# Patient Record
Sex: FEMALE | Race: WHITE | Employment: UNEMPLOYED | URBAN - METROPOLITAN AREA
[De-identification: names, ages, dates, MRNs, and addresses within clinical notes are randomized per-mention and may not be internally consistent; named-entity substitution may affect disease eponyms.]

---

## 2018-07-12 ENCOUNTER — OFFICE VISIT (OUTPATIENT)
Dept: FAMILY MEDICINE CLINIC | Age: 38
End: 2018-07-12

## 2018-07-12 VITALS
SYSTOLIC BLOOD PRESSURE: 150 MMHG | RESPIRATION RATE: 16 BRPM | TEMPERATURE: 98.9 F | BODY MASS INDEX: 46.71 KG/M2 | OXYGEN SATURATION: 98 % | WEIGHT: 263.6 LBS | HEIGHT: 63 IN | DIASTOLIC BLOOD PRESSURE: 90 MMHG | HEART RATE: 114 BPM

## 2018-07-12 DIAGNOSIS — R03.0 ELEVATED BP WITHOUT DIAGNOSIS OF HYPERTENSION: ICD-10-CM

## 2018-07-12 DIAGNOSIS — R22.2 ABDOMINAL WALL LUMP: Primary | ICD-10-CM

## 2018-07-12 DIAGNOSIS — Z31.9 PATIENT DESIRES PREGNANCY: ICD-10-CM

## 2018-07-12 DIAGNOSIS — Z87.42 HX OF ABNORMAL CERVICAL PAP SMEAR: ICD-10-CM

## 2018-07-12 DIAGNOSIS — Z76.89 ESTABLISHING CARE WITH NEW DOCTOR, ENCOUNTER FOR: ICD-10-CM

## 2018-07-12 NOTE — PROGRESS NOTES
HPI     Chief Complaint   Patient presents with   [address]     She is a 40 y.o. female with hx of preeclampsia in prior pregnancy who presents to establish care and discuss new abdominal mass. Establishing Care  - Recently moved to [facility],  is in the [employer]  - No current medical problems  - Not on any medications  - UTD on immunizations that she knows of    Attempting to get Pregnant  - Has been trying since October when she stopped her OCP  - States she was able to get pregnant easily with her last baby  - No hx of STIs/ infections  -  - delivered in  [place]  at 44 weeks, induced for preeclampsia    Hx of Preeclampsia, Elevated BP today  - Typically borderline 130/80s, has been tracking BPs at home for last week  - Was not on Mg during her last pregancy  - States she did not have hx of HTN before pregnancy but admits she was not regularly seeing a PCP either    Hx of Abnormal PAP  - Abnormal PAP 4 years ago, unsure what diagnosis was  - Was supposed to have repeat 3 years ago but has not   - Would like to have PAP    Abdominal Mass  - States she first noticed it 1.5 years ago  - Over liver edge  - States the mass has slowly been getting larger  - Denies pain, weight loss, n/v    Review of Systems   Constitutional: Negative for activity change and unexpected weight change. Gastrointestinal: Negative for abdominal pain, nausea and vomiting. Genitourinary: Negative for menstrual problem and pelvic pain. Neurological: Negative for headaches. Reviewed PmHx, RxHx, FmHx, SocHx, AllgHx and updated and dated in the chart. Physical Exam:  Visit Vitals    /90 (BP 1 Location: Left arm, BP Patient Position: Sitting)    Pulse (!) 114    Temp 98.9 °F (37.2 °C) (Oral)    Resp 16    Ht 5' 3\" (1.6 m)    Wt 263 lb 9.6 oz (119.6 kg)    LMP 2018 (Exact Date)    SpO2 98%    BMI 46.69 kg/m2     Physical Exam   Constitutional: She appears well-developed and well-nourished. No distress. Cardiovascular: Normal rate, regular rhythm and normal heart sounds. Exam reveals no gallop and no friction rub. No murmur heard. Pulmonary/Chest: Effort normal and breath sounds normal. No respiratory distress. She has no wheezes. She has no rales. Abdominal: Soft. She exhibits no distension. There is no tenderness. There is no rebound and no guarding. Morbidly obese, small mobile round abdominal wall mass [id number] in RUQ over liver edge without TTP   Skin: Skin is warm and dry. She is not diaphoretic. Psychiatric: She has a normal mood and affect. Her behavior is normal.   Nursing note and vitals reviewed. No results found for this or any previous visit (from the past 12 hour(s)).        Assessment / Plan     Abdominal Wall Lump  - On PE appears to be lipoma but will order US to better visualize as it has been growing in size and she has had no further workup  - Abd US for RUQ    Establishing Care with New Doctor  - Instructed to come back next week for CPE at which time we will do a PAP and routine blood work  - Recommend her filling out a release of records form so we can request prior labs/ studies from her last OB    Patient desires pregnancy  - Counseled patient that most healthy patients who have had a prior successful pregnancy get pregnant within the 1st year of trying  - Patent admits she is 30 lbs heavier since her last pregnancy and this may be contributing to difficulty in conceiving  - Discussed risks if she were to become pregnant again, would likely need to be on baby ASA for her hx of PreE and would recommend genetic testing for AMA    Elevated BP without diagnosis of HTN  - Hx of PreE in prior pregnancy, unsure of any hx of chronic HTN  - Patient has BP log with her today and appears very borderline with BP 130s/ 80s often  - Recommend patient continue to keep log of BP and bring them to her next appt in 1 week    Hx of Abnormal PAP  - Will repeat PAP at next appt for CPE    Follow up in 1 week for CPE    I have discussed the diagnosis with the patient and the intended plan as seen in the above orders. The patient has received an after-visit summary and questions were answered concerning future plans. I have discussed medication side effects and warnings with the patient as well.     Patient discussed with Dr. Cm Perez MD (Attending)    Kp Llamas DO  Family Medicine Resident

## 2018-07-12 NOTE — PATIENT INSTRUCTIONS
If you have not received a phone call within 24 hours regarding scheduling your diagnostic imaging please call central scheduling at [phone]. Ultrasound of the Abdomen: About This Test  What is it? An abdominal ultrasound uses reflected sound waves to produce a picture of the organs and blood vessels in the upper belly. These include your liver, gallbladder, spleen, pancreas, kidneys, and major blood vessels like the aorta. The sound waves create a picture on a video monitor. Why is this test done? An ultrasound can be done to:  · Find out what's causing belly pain-a gallstone, for example. · Look at a lump or mass that was found in a prior exam and, in some cases, tell what it is. · Check for problems in the liver and kidneys. · Look for changes in an aortic aneurysm, a bulging section in the wall of the large artery that carries blood out of the heart. · Find fluid in the belly. · Guide needles or other medical instruments in treatment. How can you prepare for the test?  · Depending on the part of your belly your doctor will be looking at, you may need to eat a fat-free meal the night before your test. Or you may need to avoid eating for 4 to 12 hours before the test. Your doctor will tell you what to do. What happens before the test?  · You will need to take off any jewelry that might interfere with the ultrasound test.  · You will need to take off your clothes around the area to be scanned. You will get a cloth or paper covering to use during the test.  What happens during the test?  · You lie down on your back or your side on an exam table. · The doctor or technologist spreads some warm gel on your belly to improve the transmission of the sound waves. A small handheld unit called a transducer is pressed against your belly and is moved back and forth. A picture of the organs and blood vessels can be seen on a video monitor. · You need to lie still.  You may be asked to take a breath and hold it for several seconds during the scanning. What else should you know about the test?  · The ultrasound is a very safe procedure. · There is no discomfort from the test itself. If your belly hurts already, you will feel some pain from the probe being pressed down on it. · You will not hear or feel the sound waves. How long does the test take? · The test will take about 30 minutes. · You may be asked to wait until the radiologist has reviewed the scan. He or she may want to do more ultrasound views of some areas of your belly. What happens after the test?  · You will probably be able to go home right away. · You can go back to your usual activities right away. Follow-up care is a key part of your treatment and safety. Be sure to make and go to all appointments, and call your doctor if you are having problems. It's also a good idea to keep a list of the medicines you take. Ask your doctor when you can expect to have your test results. Where can you learn more? Go to http://mirela-servando.info/. Enter T376 in the search box to learn more about \"Ultrasound of the Abdomen: About This Test.\"  Current as of: October 9, 2017  Content Version: 11.7  © 9568-5185 [employer]. Care instructions adapted under license by seniorshelf.com (which disclaims liability or warranty for this information).  If you have questions about a medical condition or this instruction, always ask your healthcare professional. [place] [address] any warranty or liability for your use of this information.

## 2018-07-12 NOTE — MR AVS SNAPSHOT
[address] 
[phone] Patient: Anita Padron MRN: [id number]:[id number] Visit Information Date & Time Provider Department Dept. Phone Encounter #  
 7/12/2018  1:30 PM Mack Sharma, [address] [phone] [id number] Upcoming Health Maintenance Date Due DTaP/Tdap/Td series (1 - Tdap) 8/21/2002 PAP AKA CERVICAL CYTOLOGY 8/21/2002 Influenza Age 5 to Adult 8/1/2018 Allergies as of 7/12/2018  Review Complete On: 7/12/2018 By: Trista Rollins Severity Noted Reaction Type Reactions Penicillins  07/12/2018    Rash Current Immunizations  Never Reviewed No immunizations on file. Not reviewed this visit You Were Diagnosed With   
  
 Codes Comments Abdominal wall lump    -  Primary ICD-10-CM: R22.2 ICD-9-CM: 789.30 Vitals BP Pulse Temp Resp Height(growth percentile) Weight(growth percentile) 150/90 (BP 1 Location: Left arm, BP Patient Position: Sitting) (!) 114 98.9 °F (37.2 °C) (Oral) 16 5' 3\" (1.6 m) 263 lb 9.6 oz (119.6 kg) LMP SpO2 BMI OB Status Smoking Status 07/04/2018 (Exact Date) 98% 46.69 kg/m2 Having regular periods Never Smoker Vitals History BMI and BSA Data Body Mass Index Body Surface Area  
 46.69 kg/m 2 2.31 m 2 Preferred Pharmacy Pharmacy Name Phone [address], [facility] [address] [facility] [address] [phone] Your Updated Medication List  
  
Notice  As of 7/12/2018  2:32 PM  
 You have not been prescribed any medications. To-Do List   
 07/13/2018 Imaging:  US ABD LTD Patient Instructions If you have not received a phone call within 24 hours regarding scheduling your diagnostic imaging please call central scheduling at [phone]. Ultrasound of the Abdomen: About This Test 
What is it? An abdominal ultrasound uses reflected sound waves to produce a picture of the organs and blood vessels in the upper belly. These include your liver, gallbladder, spleen, pancreas, kidneys, and major blood vessels like the aorta. The sound waves create a picture on a video monitor. Why is this test done? An ultrasound can be done to: · Find out what's causing belly pain-a gallstone, for example. · Look at a lump or mass that was found in a prior exam and, in some cases, tell what it is. · Check for problems in the liver and kidneys. · Look for changes in an aortic aneurysm, a bulging section in the wall of the large artery that carries blood out of the heart. · Find fluid in the belly. · Guide needles or other medical instruments in treatment. How can you prepare for the test? 
· Depending on the part of your belly your doctor will be looking at, you may need to eat a fat-free meal the night before your test. Or you may need to avoid eating for 4 to 12 hours before the test. Your doctor will tell you what to do. What happens before the test? 
· You will need to take off any jewelry that might interfere with the ultrasound test. 
· You will need to take off your clothes around the area to be scanned. You will get a cloth or paper covering to use during the test. 
What happens during the test? 
· You lie down on your back or your side on an exam table. · The doctor or technologist spreads some warm gel on your belly to improve the transmission of the sound waves. A small handheld unit called a transducer is pressed against your belly and is moved back and forth. A picture of the organs and blood vessels can be seen on a video monitor. · You need to lie still. You may be asked to take a breath and hold it for several seconds during the scanning. What else should you know about the test? 
· The ultrasound is a very safe procedure. · There is no discomfort from the test itself.  If your belly hurts already, you will feel some pain from the probe being pressed down on it. · You will not hear or feel the sound waves. How long does the test take? · The test will take about 30 minutes. · You may be asked to wait until the radiologist has reviewed the scan. He or she may want to do more ultrasound views of some areas of your belly. What happens after the test? 
· You will probably be able to go home right away. · You can go back to your usual activities right away. Follow-up care is a key part of your treatment and safety. Be sure to make and go to all appointments, and call your doctor if you are having problems. It's also a good idea to keep a list of the medicines you take. Ask your doctor when you can expect to have your test results. Where can you learn more? Go to http://mirela-servando.info/. Enter T376 in the search box to learn more about \"Ultrasound of the Abdomen: About This Test.\" Current as of: October 9, 2017 Content Version: 11.7 © 6975-5914 Healthwise, Incorporated. Care instructions adapted under license by [employer] (which disclaims liability or warranty for this information). If you have questions about a medical condition or this instruction, always ask your healthcare professional. [address] any warranty or liability for your use of this information. 
  
 
 
 
  
Introducing [facility]! [facility] introduces ivi.ru patient portal. Now you can access parts of your medical record, email your doctor's office, and request medication refills online. 1. In your internet browser, go to [email or web address] 2. Click on the First Time User? Click Here link in the Sign In box. You will see the New Member Sign Up page. 3. Enter your ivi.ru Access Code exactly as it appears below. You will not need to use this code after youve completed the sign-up process.  If you do not sign up before the expiration date, you must request a new code. · [employer] Access Code: [email or web address] Expires: 10/10/2018  2:30 PM 
 
4. Enter the last four digits of your Social Security Number (xxxx) and Date of Birth (mm/dd/yyyy) as indicated and click Submit. You will be taken to the next sign-up page. 5. Create a Jack [employer] Block ID. This will be your Jack [employer] login ID and cannot be changed, so think of one that is secure and easy to remember. 6. Create a [employer] password. You can change your password at any time. 7. Enter your Password Reset Question and Answer. This can be used at a later time if you forget your password. 8. Enter your e-mail address. You will receive e-mail notification when new information is available in [address] 9. Click Sign Up. You can now view and download portions of your medical record. 10. Click the Download Summary menu link to download a portable copy of your medical information. If you have questions, please visit the Frequently Asked Questions section of the Jack [employer] website. Remember, [employer] is NOT to be used for urgent needs. For medical emergencies, dial 911. Now available from your iPhone and Android! Please provide this summary of care documentation to your next provider. If you have any questions after today's visit, please call [phone].

## 2018-07-13 PROBLEM — Z87.42 HX OF ABNORMAL CERVICAL PAP SMEAR: Status: ACTIVE | Noted: 2018-07-13

## 2018-07-13 PROBLEM — R03.0 ELEVATED BP WITHOUT DIAGNOSIS OF HYPERTENSION: Status: ACTIVE | Noted: 2018-07-13

## 2018-07-19 ENCOUNTER — HOSPITAL ENCOUNTER (OUTPATIENT)
Dept: LAB | Age: 38
Discharge: HOME | End: 2018-07-19
Payer: OTHER GOVERNMENT

## 2018-07-19 ENCOUNTER — OFFICE VISIT (OUTPATIENT)
Dept: FAMILY MEDICINE CLINIC | Age: 38
End: 2018-07-19

## 2018-07-19 VITALS
WEIGHT: 264.8 LBS | HEIGHT: 63 IN | RESPIRATION RATE: 16 BRPM | SYSTOLIC BLOOD PRESSURE: 154 MMHG | DIASTOLIC BLOOD PRESSURE: 92 MMHG | TEMPERATURE: 99.5 F | HEART RATE: 89 BPM | BODY MASS INDEX: 46.92 KG/M2 | OXYGEN SATURATION: 98 %

## 2018-07-19 DIAGNOSIS — Z29.9 PREVENTIVE MEASURE: Primary | ICD-10-CM

## 2018-07-19 DIAGNOSIS — Z80.3 FAMILY HISTORY OF BREAST CANCER: ICD-10-CM

## 2018-07-19 DIAGNOSIS — R03.0 ELEVATED BP WITHOUT DIAGNOSIS OF HYPERTENSION: ICD-10-CM

## 2018-07-19 DIAGNOSIS — D22.9 ATYPICAL MOLE: ICD-10-CM

## 2018-07-19 DIAGNOSIS — Z80.3 FHX: BREAST CANCER IN FIRST DEGREE RELATIVE: ICD-10-CM

## 2018-07-19 PROCEDURE — 87624 HPV HI-RISK TYP POOLED RSLT: CPT | Performed by: FAMILY MEDICINE

## 2018-07-19 PROCEDURE — 88175 CYTOPATH C/V AUTO FLUID REDO: CPT | Performed by: FAMILY MEDICINE

## 2018-07-19 NOTE — PATIENT INSTRUCTIONS
If you have not received a phone call within 24 hours regarding scheduling your diagnostic imaging please call central scheduling at [phone]. Skin Lesions: Care Instructions  Your Care Instructions  A skin lesion is a general term used for the different types of bumps, spots, moles or other growths that may appear on your skin. Most skin lesions are harmless, but sometimes they can be a sign of skin cancer or other health problems. Depending on what type of lesion you have, your doctor may cut out all or a small area of the skin tissue and send it to a lab to be looked at under a microscope. This is called a biopsy. A biopsy may be done to figure out what the lesion is or to make sure it is not skin cancer. Follow-up care is a key part of your treatment and safety. Be sure to make and go to all appointments, and call your doctor if you are having problems. It's also a good idea to know your test results and keep a list of the medicines you take. How can you care for yourself at home? · If your doctor told you how to care for your wound, follow your doctor's instructions. If you did not get instructions, follow this general advice:  ¨ Keep the wound bandaged and dry for the first day. ¨ After the first day, wash around the wound with clean water 2 times a day. Don't use hydrogen peroxide or alcohol, which can slow healing. ¨ You may cover the wound with a thin layer of petroleum jelly, such as Vaseline, and a nonstick bandage. ¨ Apply more petroleum jelly and replace the bandage as needed. · If you have stitches, you may get other instructions. You will have to return to have the stitches removed. · If a scab forms, do not pull it off. Let it fall off on its own. Wounds heal faster if no scab forms. Washing the area every day and using petroleum jelly will help keep a scab from forming. · If the wound bleeds, put direct pressure on it with a clean cloth until the bleeding stops.   · Take an over-the-counter pain medicine, such as acetaminophen (Tylenol), ibuprofen (Advil, Motrin), or naproxen (Aleve). Read and follow all instructions on the label. · Do not take two or more pain medicines at the same time unless the doctor told you to. Many pain medicines have acetaminophen, which is Tylenol. Too much acetaminophen (Tylenol) can be harmful. · If you had a growth \"frozen\" off with liquid nitrogen, you may get a blister. Do not break it. Let it dry up on its own. It is common for the blister to fill with blood. You do not need to do anything about this, but if it becomes too painful, call your doctor. When should you call for help? Call your doctor now or seek immediate medical care if:     · You have signs of infection, such as:  ¨ Increased pain, swelling, warmth, or redness. ¨ Red streaks leading from the wound. ¨ Pus draining from the wound. ¨ A fever.    Watch closely for changes in your health, and be sure to contact your doctor if:     · The wound changes, bleeds, or gets worse.      · You do not get better after 2 weeks of home care. Where can you learn more? Go to http://mirela-servando.info/. Enter U851 in the search box to learn more about \"Skin Lesions: Care Instructions. \"  Current as of: October 5, 2017  Content Version: 11.7  © 4232-8618 Intelligent InSites. Care instructions adapted under license by StreetÂ LibraryÂ Network (which disclaims liability or warranty for this information).  If you have questions about a medical condition or this instruction, always ask your healthcare professional. [address] any warranty or liability for your use of this information.

## 2018-07-19 NOTE — PROGRESS NOTES
HPI     Chief Complaint   Patient presents with    Gyn Exam     pap only     She is a 40 y.o. female who presents for PAP and preventive care. Preventive care  - Would like preventive labs, PAP today    Elevated BP without hx of HTN  - Hx of preeclampsia in prior pregnacies  - Risk factors: morbid obesity   - Has been taking -270 systolic and 08C diastolic at home  - Does not adhere to a healthy diet    Moles   - Has had multiple moles for years   - Has a mole on her breast that has changed colors, has not gotten bigger  - Wears sunscreen  - Has not seen a dermatologist recently    Family Hx of breast cancer  - Mom had breast cancer in 35s  - Mom had lumpectomy did not require chemo  - Mom has not had genetic testing  - Has never had mammogram    Review of Systems   Eyes: Negative for visual disturbance. Gastrointestinal: Negative for abdominal pain. Genitourinary: Negative for vaginal bleeding, vaginal discharge and vaginal pain. Neurological: Negative for headaches. Reviewed PmHx, RxHx, FmHx, SocHx, AllgHx and updated and dated in the chart. Physical Exam:  Visit Vitals    BP (!) 154/92 (BP 1 Location: Left arm, BP Patient Position: Sitting)    Pulse 89    Temp 99.5 °F (37.5 °C) (Oral)    Resp 16    Ht 5' 3\" (1.6 m)    Wt 264 lb 12.8 oz (120.1 kg)    LMP 07/04/2018 (Exact Date)    SpO2 98%    BMI 46.91 kg/m2     Physical Exam   Constitutional: She appears well-developed and well-nourished. No distress. Cardiovascular: Normal rate, regular rhythm and normal heart sounds. Exam reveals no gallop and no friction rub. No murmur heard. Pulmonary/Chest: Effort normal and breath sounds normal. No respiratory distress. She has no wheezes. She has no rales. Abdominal: Soft. She exhibits no distension and no mass. There is no tenderness. There is no rebound and no guarding. Genitourinary: Vagina normal and uterus normal. No vaginal discharge found.    Genitourinary Comments: Cervix very anterior, closed with scant white discharge, no cervical motion tenderness, no adenopathy. Skin: She is not diaphoretic. Small 3mm mole, red in color, not completely symmetrical on L breast     No results found for this or any previous visit (from the past 12 hour(s)). Assessment / Plan     Preventive Care  - Labs - CBC, CMP, HgA1c, lipid panel, TSH  - Recommend dietary and lifestyle modifications due to morbid obesity, BMI 46  - PAP today    Fam Hx of breast cancer  - Mom had breast cancer in early 35s  - Mom is still living  - Recommend BL mammogram   - Mom would be a good candidate for possible genetic screening given young age    Elevated BP without diagnosis of HTN  - Continuing to montior with home BP cuff and SBP <130 and DBP <80 always  - Initially high, recheck today was WNL  - Discussed given her morbid obesity she is at high risk for developing true dx of HTN and she should lose weight to decrease this risk     Moles  - Does have 1 small mole on L breast that is <6mm in size however given hx of changing color would recommend a punch biopsy  - Instructed patient to schedule a punch biopsy as soon as possible     Follow up in next 2 weeks for punch biopsy    I have discussed the diagnosis with the patient and the intended plan as seen in the above orders. The patient has received an after-visit summary and questions were answered concerning future plans. I have discussed medication side effects and warnings with the patient as well.     Patient discussed with Dr. Kate Guerra MD (Attending)    Zay Ayala DO  Family Medicine Resident

## 2018-07-19 NOTE — PROGRESS NOTES
Chief Complaint   Patient presents with    Gyn Exam     pap only     1. Have you been to the ER, urgent care clinic since your last visit? Hospitalized since your last visit? No    2. Have you seen or consulted any other health care providers outside of the [address] [place] [address] since your last visit? Include any pap smears or colon screening.  No

## 2018-07-19 NOTE — MR AVS SNAPSHOT
[address] 
[phone] Patient: Tyrone Holland MRN: [id number] XX Visit Information Date & Time Provider Department Dept. Phone Encounter #  
 2018  4:00 PM Yimi Turner, [facility] [phone] [id number] Upcoming Health Maintenance Date Due DTaP/Tdap/Td series (1 - Tdap) 2002 PAP AKA CERVICAL CYTOLOGY 2002 Influenza Age 5 to Adult 2018 Allergies as of 2018  Review Complete On: 2018 By: Renato Hager Severity Noted Reaction Type Reactions Penicillins  2018    Rash Current Immunizations  Never Reviewed No immunizations on file. Not reviewed this visit You Were Diagnosed With   
  
 Codes Comments Family history of breast cancer    -  Primary ICD-10-CM: Z80.3 ICD-9-CM: V16.3 Preventive measure     ICD-10-CM: Z29.9 ICD-9-CM: V07.9 Vitals BP Pulse Temp Resp Height(growth percentile) Weight(growth percentile) (!) 154/92 (BP 1 Location: Left arm, BP Patient Position: Sitting) 89 99.5 °F (37.5 °C) (Oral) 16 5' 3\" (1.6 m) 264 lb 12.8 oz (120.1 kg) LMP SpO2 BMI OB Status Smoking Status 2018 (Exact Date) 98% 46.91 kg/m2 Having regular periods Never Smoker Vitals History BMI and BSA Data Body Mass Index Body Surface Area  
 46.91 kg/m 2 2.31 m 2 Preferred Pharmacy Pharmacy Name Phone [address], [address] [place] [address] [address] [facility] [phone] Your Updated Medication List  
  
Notice  As of 2018  4:39 PM  
 You have not been prescribed any medications. To-Do List   
 2018 Lab:  CBC W/O DIFF   
  
 2018 Lab:  HEMOGLOBIN A1C WITH EAG   
  
 2018 Lab:  LIPID PANEL   
  
 2018 Imaging:  JAYLIN MAMMO BI SCREENING INCL CAD   
  
 2018 Lab: METABOLIC PANEL, COMPREHENSIVE   
  
 07/20/2018 Lab:  TSH 3RD GENERATION   
  
 07/26/2018 10:00 AM  
  Appointment with Sapna Holloway at [facility] ([phone]) General  NPO DIET RESTICTIONS Please be NPO (nothing by mouth) for 6- 8 hours prior to procedure. GENERAL INSTRUCTIONS 1. Bring any non [facility] facility films/reports pertaining to the area being studied with you on the day of appointment. 2. A written order with a valid diagnosis and Physicians signature is required for all scheduled tests. 3. Check in at registration 30 minutes before your appointment time unless you were instructed to do otherwise. Patient Instructions Skin Lesions: Care Instructions Your Care Instructions A skin lesion is a general term used for the different types of bumps, spots, moles or other growths that may appear on your skin. Most skin lesions are harmless, but sometimes they can be a sign of skin cancer or other health problems. Depending on what type of lesion you have, your doctor may cut out all or a small area of the skin tissue and send it to a lab to be looked at under a microscope. This is called a biopsy. A biopsy may be done to figure out what the lesion is or to make sure it is not skin cancer. Follow-up care is a key part of your treatment and safety. Be sure to make and go to all appointments, and call your doctor if you are having problems. It's also a good idea to know your test results and keep a list of the medicines you take. How can you care for yourself at home? · If your doctor told you how to care for your wound, follow your doctor's instructions. If you did not get instructions, follow this general advice: ¨ Keep the wound bandaged and dry for the first day. ¨ After the first day, wash around the wound with clean water 2 times a day. Don't use hydrogen peroxide or alcohol, which can slow healing. ¨ You may cover the wound with a thin layer of petroleum jelly, such as Vaseline, and a nonstick bandage. ¨ Apply more petroleum jelly and replace the bandage as needed. · If you have stitches, you may get other instructions. You will have to return to have the stitches removed. · If a scab forms, do not pull it off. Let it fall off on its own. Wounds heal faster if no scab forms. Washing the area every day and using petroleum jelly will help keep a scab from forming. · If the wound bleeds, put direct pressure on it with a clean cloth until the bleeding stops. · Take an over-the-counter pain medicine, such as acetaminophen (Tylenol), ibuprofen (Advil, Motrin), or naproxen (Aleve). Read and follow all instructions on the label. · Do not take two or more pain medicines at the same time unless the doctor told you to. Many pain medicines have acetaminophen, which is Tylenol. Too much acetaminophen (Tylenol) can be harmful. · If you had a growth \"frozen\" off with liquid nitrogen, you may get a blister. Do not break it. Let it dry up on its own. It is common for the blister to fill with blood. You do not need to do anything about this, but if it becomes too painful, call your doctor. When should you call for help? Call your doctor now or seek immediate medical care if: 
   · You have signs of infection, such as: 
¨ Increased pain, swelling, warmth, or redness. ¨ Red streaks leading from the wound. ¨ Pus draining from the wound. ¨ A fever.  
 Watch closely for changes in your health, and be sure to contact your doctor if: 
   · The wound changes, bleeds, or gets worse.  
   · You do not get better after 2 weeks of home care. Where can you learn more? Go to http://mirela-srevando.info/. Enter I189 in the search box to learn more about \"Skin Lesions: Care Instructions. \" Current as of: October 5, 2017 Content Version: 11.7 © 7961-2667 Healthwise, Incorporated. Care instructions adapted under license by [employer] (which disclaims liability or warranty for this information). If you have questions about a medical condition or this instruction, always ask your healthcare professional. [address] any warranty or liability for your use of this information. 
  
 
 
 
  
Introducing [facility] & HEALTH SERVICES! [facility] introduces [employer] patient portal. Now you can access parts of your medical record, email your doctor's office, and request medication refills online. 1. In your internet browser, go to [email or web address] 2. Click on the First Time User? Click Here link in the Sign In box. You will see the New Member Sign Up page. 3. Enter your [employer] Access Code exactly as it appears below. You will not need to use this code after youve completed the sign-up process. If you do not sign up before the expiration date, you must request a new code. · [employer] Access Code: [email or web address] Expires: 10/10/2018  2:30 PM 
 
4. Enter the last four digits of your Social Security Number (xxxx) and Date of Birth (mm/dd/yyyy) as indicated and click Submit. You will be taken to the next sign-up page. 5. Create a [employer] ID. This will be your [employer] login ID and cannot be changed, so think of one that is secure and easy to remember. 6. Create a [employer] password. You can change your password at any time. 7. Enter your Password Reset Question and Answer. This can be used at a later time if you forget your password. 8. Enter your e-mail address. You will receive e-mail notification when new information is available in [address] 9. Click Sign Up. You can now view and download portions of your medical record. 10. Click the Download Summary menu link to download a portable copy of your medical information.  
 
If you have questions, please visit the Frequently Asked Questions section of the VFA. Remember, [employer] is NOT to be used for urgent needs. For medical emergencies, dial 911. Now available from your iPhone and Android! Please provide this summary of care documentation to your next provider. If you have any questions after today's visit, please call [phone].

## 2018-07-23 ENCOUNTER — LAB ONLY (OUTPATIENT)
Dept: FAMILY MEDICINE CLINIC | Age: 38
End: 2018-07-23

## 2018-07-23 DIAGNOSIS — Z29.9 PREVENTIVE MEASURE: ICD-10-CM

## 2018-07-23 PROBLEM — Z80.3 FHX: BREAST CANCER IN FIRST DEGREE RELATIVE: Status: ACTIVE | Noted: 2018-07-23

## 2018-07-23 NOTE — MR AVS SNAPSHOT
2100 [facility] 1007 [facility] 
[phone] Patient: Stacy Morrow MRN: [id number] [id number] Visit Information Date & Time Provider Department Dept. Phone Encounter #  
 7/23/2018  8:30 AM LAB [facility] [address] [phone] [id number] Your Appointments 8/3/2018 11:15 AM  
ROUTINE CARE with Arcenio Bunn DO [address] ([address]) Appt Note: Biopsy [address] 1007 [facility]  
[phone]  
  
   
 [address] [facility] 99 [address] Upcoming Health Maintenance Date Due DTaP/Tdap/Td series (1 - Tdap) 8/21/2002 PAP AKA CERVICAL CYTOLOGY 8/21/2002 Influenza Age 5 to Adult 8/1/2018 Allergies as of 7/23/2018  Review Complete On: 7/23/2018 By: Arcenio Bunn DO Severity Noted Reaction Type Reactions Penicillins  07/12/2018    Rash Current Immunizations  Never Reviewed No immunizations on file. Not reviewed this visit You Were Diagnosed With   
  
 Codes Comments Preventive measure     ICD-10-CM: Z29.9 ICD-9-CM: V07.9 Vitals LMP OB Status Smoking Status 07/04/2018 (Exact Date) Having regular periods Never Smoker Preferred Pharmacy Pharmacy Name Phone [address], [address] [place] [address] [address]. [address] [facility] [address] [phone] Your Updated Medication List  
  
Notice  As of 7/23/2018  4:38 PM  
 You have not been prescribed any medications. We Performed the Following CBC W/O DIFF [19911 CPT(R)] HEMOGLOBIN A1C WITH EAG [52561 CPT(R)] LIPID PANEL [74209 CPT(R)] METABOLIC PANEL, COMPREHENSIVE [22195 CPT(R)] TSH 3RD GENERATION [24482 CPT(R)] To-Do List   
 07/26/2018 10:00 AM  
  Appointment with Sapna Holloway at [facility] ([phone]) General  NPO DIET RESTICTIONS Please be NPO (nothing by mouth) for 6- 8 hours prior to procedure. GENERAL INSTRUCTIONS 1. Bring any non [facility] facility films/reports pertaining to the area being studied with you on the day of appointment. 2. A written order with a valid diagnosis and Physicians signature is required for all scheduled tests. 3. Check in at registration 30 minutes before your appointment time unless you were instructed to do otherwise.  
  
 07/30/2018 12:30 PM  
  Appointment with [facility] JAYLIN 1 at [place] [address] Mammography ([phone]) Shower or bathe using soap and water. Do not use deodorant, powder, perfumes, or lotion the day of your exam.  If your prior mammograms were not performed at [facility] 6 please bring films with you or forward prior images 2 days before your procedure. Check in at registration 15min before your appointment time unless you were instructed to do otherwise. A script is not necessary, but if you have one, please bring it on the day of the mammogram or have it faxed to the department. You are responsible for finding a method of transportation to your appointment. If you don't have transportation, please reschedule your appointment at least 24 hours in advance. [facility] [phone] [facility]  [phone] [facility] 19 [facility]  [phone] [facility] [phone] [address] [phone] Introducing [facility] & HE[facility] SERVICES! Case Brito introduces [employer] patient portal. Now you can access parts of your medical record, email your doctor's office, and request medication refills online. 1. In your internet browser, go to [email or web address] 2. Click on the First Time User? Click Here link in the Sign In box. You will see the New Member Sign Up page. 3. Enter your [employer] Access Code exactly as it appears below. You will not need to use this code after youve completed the sign-up process.  If you do not sign up before the expiration date, you must request a new code. · [employer] Access Code: [email or web address] Expires: 10/10/2018  2:30 PM 
 
4. Enter the last four digits of your Social Security Number (xxxx) and Date of Birth (mm/dd/yyyy) as indicated and click Submit. You will be taken to the next sign-up page. 5. Create a [employer] ID. This will be your [employer] login ID and cannot be changed, so think of one that is secure and easy to remember. 6. Create a [employer] password. You can change your password at any time. 7. Enter your Password Reset Question and Answer. This can be used at a later time if you forget your password. 8. Enter your e-mail address. You will receive e-mail notification when new information is available in [address] 9. Click Sign Up. You can now view and download portions of your medical record. 10. Click the Download Summary menu link to download a portable copy of your medical information. If you have questions, please visit the Frequently Asked Questions section of the [employer] website. Remember, [employer] is NOT to be used for urgent needs. For medical emergencies, dial 911. Now available from your iPhone and Android! Please provide this summary of care documentation to your next provider. If you have any questions after today's visit, please call [phone].

## 2018-07-24 ENCOUNTER — TELEPHONE (OUTPATIENT)
Dept: FAMILY MEDICINE CLINIC | Age: 38
End: 2018-07-24

## 2018-07-24 LAB
ALBUMIN SERPL-MCNC: 4.2 G/DL (ref 3.5–5.5)
ALBUMIN/GLOB SERPL: 1.4 {RATIO} (ref 1.2–2.2)
ALP SERPL-CCNC: 66 IU/L (ref 39–117)
ALT SERPL-CCNC: 41 IU/L (ref 0–32)
AST SERPL-CCNC: 26 IU/L (ref 0–40)
BILIRUB SERPL-MCNC: 1.2 MG/DL (ref 0–1.2)
BUN SERPL-MCNC: 13 MG/DL (ref 6–20)
BUN/CREAT SERPL: 16 (ref 9–23)
CALCIUM SERPL-MCNC: 9.1 MG/DL (ref 8.7–10.2)
CHLORIDE SERPL-SCNC: 105 MMOL/L (ref 96–106)
CHOLEST SERPL-MCNC: 186 MG/DL (ref 100–199)
CO2 SERPL-SCNC: 20 MMOL/L (ref 20–29)
CREAT SERPL-MCNC: 0.8 MG/DL (ref 0.57–1)
ERYTHROCYTE [DISTWIDTH] IN BLOOD BY AUTOMATED COUNT: 14.2 % (ref 12.3–15.4)
EST. AVERAGE GLUCOSE BLD GHB EST-MCNC: 105 MG/DL
GLOBULIN SER CALC-MCNC: 2.9 G/DL (ref 1.5–4.5)
GLUCOSE SERPL-MCNC: 92 MG/DL (ref 65–99)
HBA1C MFR BLD: 5.3 % (ref 4.8–5.6)
HCT VFR BLD AUTO: 42 % (ref 34–46.6)
HDLC SERPL-MCNC: 40 MG/DL
HGB BLD-MCNC: 14.1 G/DL (ref 11.1–15.9)
INTERPRETATION, 910389: NORMAL
LDLC SERPL CALC-MCNC: 122 MG/DL (ref 0–99)
MCH RBC QN AUTO: 27.6 PG (ref 26.6–33)
MCHC RBC AUTO-ENTMCNC: 33.6 G/DL (ref 31.5–35.7)
MCV RBC AUTO: 82 FL (ref 79–97)
PLATELET # BLD AUTO: 338 X10E3/UL (ref 150–379)
POTASSIUM SERPL-SCNC: 4.8 MMOL/L (ref 3.5–5.2)
PROT SERPL-MCNC: 7.1 G/DL (ref 6–8.5)
RBC # BLD AUTO: 5.1 X10E6/UL (ref 3.77–5.28)
SODIUM SERPL-SCNC: 140 MMOL/L (ref 134–144)
TRIGL SERPL-MCNC: 118 MG/DL (ref 0–149)
TSH SERPL DL<=0.005 MIU/L-ACNC: 2.01 UIU/ML (ref 0.45–4.5)
VLDLC SERPL CALC-MCNC: 24 MG/DL (ref 5–40)
WBC # BLD AUTO: 4.8 X10E3/UL (ref 3.4–10.8)

## 2018-07-24 NOTE — TELEPHONE ENCOUNTER
7/24/2018 4:29 PM    Discussed lab results with patient. LDL mildly elevated at 122 recommend watching fried/ fast food intake and things high in cholesterol. ALT mildly elevated. Patient due to have abd US for abd wall mass. Will evaluate for possible NAFLD at that time. Consider Hep B/ C labs at next visit on August 3rd. PAP, CBC, HgA1c, TSH WNL.      Shante Luong DO  Family Med Resident, PGY2

## 2018-07-24 NOTE — PROGRESS NOTES
TSH, HgA1c, CBC WNL, CMP shows mildly elevated ALT will likely need Hep C lab and will follow up abd US

## 2018-07-26 ENCOUNTER — HOSPITAL ENCOUNTER (OUTPATIENT)
Dept: ULTRASOUND IMAGING | Age: 38
Discharge: HOME | End: 2018-07-26
Attending: FAMILY MEDICINE
Payer: OTHER GOVERNMENT

## 2018-07-26 DIAGNOSIS — R22.2 ABDOMINAL WALL LUMP: ICD-10-CM

## 2018-07-26 PROCEDURE — 76705 ECHO EXAM OF ABDOMEN: CPT

## 2018-07-30 ENCOUNTER — TELEPHONE (OUTPATIENT)
Dept: FAMILY MEDICINE CLINIC | Age: 38
End: 2018-07-30

## 2018-07-30 ENCOUNTER — HOSPITAL ENCOUNTER (OUTPATIENT)
Dept: MAMMOGRAPHY | Age: 38
Discharge: HOME | End: 2018-07-30
Attending: FAMILY MEDICINE
Payer: OTHER GOVERNMENT

## 2018-07-30 DIAGNOSIS — Z80.3 FAMILY HISTORY OF BREAST CANCER IN MOTHER: Primary | ICD-10-CM

## 2018-07-30 DIAGNOSIS — Z80.3 FAMILY HISTORY OF BREAST CANCER: ICD-10-CM

## 2018-07-30 PROCEDURE — 77067 SCR MAMMO BI INCL CAD: CPT

## 2018-07-30 NOTE — TELEPHONE ENCOUNTER
7/30/2018 5:45 PM    Spoke with patient about results of abd US and mammogram. Abd US shows lipoma. Mammogram shows BIRAD 1. Based on Roselia risk she has a 20.8% lifetime risk for breast cancer and qualifies for annual mammo and MRI. Discussed this will be a better modality with dense breast tissue. Refer to breast surgeon for discussion of genetic testing and significant fam hx of breast cancer. Patient understands and agrees to plan.     Greer Caba, DO  Family Med Resident, PGY2

## 2018-08-03 ENCOUNTER — HOSPITAL ENCOUNTER (OUTPATIENT)
Dept: LAB | Age: 38
Discharge: HOME | End: 2018-08-03

## 2018-08-03 ENCOUNTER — OFFICE VISIT (OUTPATIENT)
Dept: FAMILY MEDICINE CLINIC | Age: 38
End: 2018-08-03

## 2018-08-03 VITALS
OXYGEN SATURATION: 98 % | DIASTOLIC BLOOD PRESSURE: 92 MMHG | HEIGHT: 63 IN | RESPIRATION RATE: 17 BRPM | SYSTOLIC BLOOD PRESSURE: 141 MMHG | WEIGHT: 267 LBS | HEART RATE: 76 BPM | BODY MASS INDEX: 47.31 KG/M2 | TEMPERATURE: 98.4 F

## 2018-08-03 DIAGNOSIS — Z98.890 S/P SKIN BIOPSY: ICD-10-CM

## 2018-08-03 DIAGNOSIS — L98.9 SKIN LESION: Primary | ICD-10-CM

## 2018-08-03 NOTE — PATIENT INSTRUCTIONS
MD Janette Wright MD  [address]  [place] [address] [facility], [address] [facility],[address]  [place], [address] [place] [address]  T [phone]  F [phone]    Skin Cancer Prevention: Care Instructions  Your Care Instructions    Skin cancer is the abnormal growth of cells in the skin. It usually appears as a growth that changes in color, shape, or size. This can be a sore that does not heal or a change in a wart or a mole. Skin cancer is almost always curable when found early and treated. So it is important to see your doctor if you have any of these changes in your skin. Skin cancer is the most common type of cancer. It often appears on areas of the body that have been exposed to the sun, such as the head, face, neck, back, chest, or shoulders. Follow-up care is a key part of your treatment and safety. Be sure to make and go to all appointments, and call your doctor if you are having problems. It's also a good idea to know your test results and keep a list of the medicines you take. How can you care for yourself at home? · Wear a wide-brimmed hat and long sleeves and pants if you are going to be outdoors for a long time. · Avoid the sun between 10 a.m. and 4 p.m., which is the peak time for UV rays. · Wear sunscreen on exposed skin. Make sure to use a broad-spectrum sunscreen that has a sun protection factor (SPF) of 30 or higher. Use it every day, even when it is cloudy. · Do not use tanning booths or sunlamps. · Use lip balm or cream that has sun protection factor (SPF) to protect your lips from getting sunburned. · Wear sunglasses that block UV rays. When should you call for help? Call your doctor now or seek immediate medical care if:     · You have signs of infection, such as:  ¨ Increased pain, swelling, warmth, or redness. ¨ Red streaks leading from the area. ¨ Pus draining from the area.   ¨ A fever.    Watch closely for changes in your health, and be sure to contact your doctor if:     · You see a change in your skin, such as a growth or mole that:  ¨ Grows bigger. This may happen very slowly. ¨ Changes color. ¨ Changes shape. ¨ Starts to bleed easily.    · You have swollen glands in your armpits, groin, or neck.      · You do not get better as expected. Where can you learn more? Go to http://mirela-servando.info/. Enter P392 in the search box to learn more about \"Skin Cancer Prevention: Care Instructions. \"  Current as of: May 12, 2017  Content Version: 11.7  © 5291-9484 [employer]. Care instructions adapted under license by [employer] (which disclaims liability or warranty for this information).  If you have questions about a medical condition or this instruction, always ask your healthcare professional. [address] any warranty or liability for your use of this information.

## 2018-08-03 NOTE — PROGRESS NOTES
[facility] Medicine Procedure Note    Procedure - Punch biopsy of skin lesion on L breast  Date: 8/3/2018  Time: 5:03 PM  Indication: Diagnosis of lesion  Pre-procedure diagnosis: Change in skin color  Post-procedure diagnosis: Same as above, awaiting final pathology diagnosis  Procedure performed by: Greer Caba DO  Procedure supervised by: Dr. Suellen Gaming DO  Anesthesia: Lidocaine 1% with epinephrine  EBL: < 1cc  Description of procedure: Time out was completed verifying correct patient. The procedure and expectations following treatment were explained to the patient. The patient signed written consent form discussing risks and benefits of the procedure including infection, bleeding, damage to surrounding area. The patient was placed in supine position on exam table. The area to be treated (~4mm on L breast at 8') was exposed, cleaned with chloraprep swabs x 3. Injected 3cc of Lidocaine 1% with epi to the surrounding area until adequate anesthesia was achieved. A 4mm punch biopsy was taken from the middle the lesion over an area with scale. The cored sample was then cut away from it's base using scissors. Pressure was applied with gauze for hemostasis, no silver nitrate or stitch was needed. A pressure bandage was applied over the biopsy sites. The patient tolerated the procedure well and was without complaint.   Total time: 10 mins  Complications: None     Patient seen and discussed with Dr. Suellen Gaming DO (Attending)

## 2018-08-03 NOTE — PROGRESS NOTES
Chief Complaint   Patient presents with    Procedure     punch biopsy left breast       1. Have you been to the ER, urgent care clinic since your last visit? Hospitalized since your last visit?no    2. Have you seen or consulted any other health care providers outside of the [address] [place] [address] since your last visit? Include any pap smears or colon screening.  No

## 2018-08-03 NOTE — MR AVS SNAPSHOT
[address] [phone] Patient: Mike Martínez MRN: [id number] GED:[birth year] Visit Information Date & Time Provider Department Dept. Phone Encounter #  
 8/3/2018 11:15 AM Zay Ayala DO [facility] [facility] [phone] [id number] Upcoming Health Maintenance Date Due DTaP/Tdap/Td series (1 - Tdap) 8/21/2002 Influenza Age 5 to Adult 8/1/2018 PAP AKA CERVICAL CYTOLOGY 7/19/2021 Allergies as of 8/3/2018  Review Complete On: 8/3/2018 By: Sai Lobo LPN Severity Noted Reaction Type Reactions Penicillins  07/12/2018    Rash Current Immunizations  Never Reviewed No immunizations on file. Not reviewed this visit You Were Diagnosed With   
  
 Codes Comments Skin lesion    -  Primary ICD-10-CM: L98.9 ICD-9-CM: 709.9 Vitals BP Pulse Temp Resp Height(growth percentile) Weight(growth percentile) (!) 141/92 76 98.4 °F (36.9 °C) (Oral) 17 5' 3\" (1.6 m) 267 lb (121.1 kg) LMP SpO2 BMI OB Status Smoking Status 07/04/2018 (Exact Date) 98% 47.3 kg/m2 Having regular periods Never Smoker Vitals History BMI and BSA Data Body Mass Index Body Surface Area  
 47.3 kg/m 2 2.32 m 2 Preferred Pharmacy Pharmacy Name Phone [address], [facility] [address] [facility] [phone] Your Updated Medication List  
  
Notice  As of 8/3/2018 12:22 PM  
 You have not been prescribed any medications. We Performed the Following REFERRAL TO DERMATOLOGY [[id number] Custom] Comments:  
 Tigre Perez MD 
[facility] [address] Phone: [phone] Fax: [phone] Angel Nascimento MD 
[facility] [address] [place] [address] Phone: [phone] Fax: [phone] SURGICAL PATHOLOGY [[id number] Custom] Referral Information Referral ID Referred By Referred To  
  
 [id number] Evelio Giraldo Not Available Visits Status Start Date End Date 1 New Request 8/3/18 8/3/19 If your referral has a status of pending review or denied, additional information will be sent to support the outcome of this decision. Patient Instructions MD Ryan Rosales MD 
[address] [place] [address], [address] [phone] Skin Cancer Prevention: Care Instructions Your Care Instructions Skin cancer is the abnormal growth of cells in the skin. It usually appears as a growth that changes in color, shape, or size. This can be a sore that does not heal or a change in a wart or a mole. Skin cancer is almost always curable when found early and treated. So it is important to see your doctor if you have any of these changes in your skin. Skin cancer is the most common type of cancer. It often appears on areas of the body that have been exposed to the sun, such as the head, face, neck, back, chest, or shoulders. Follow-up care is a key part of your treatment and safety. Be sure to make and go to all appointments, and call your doctor if you are having problems. It's also a good idea to know your test results and keep a list of the medicines you take. How can you care for yourself at home? · Wear a wide-brimmed hat and long sleeves and pants if you are going to be outdoors for a long time. · Avoid the sun between 10 a.m. and 4 p.m., which is the peak time for UV rays. · Wear sunscreen on exposed skin. Make sure to use a broad-spectrum sunscreen that has a sun protection factor (SPF) of 30 or higher. Use it every day, even when it is cloudy. · Do not use tanning booths or sunlamps.  
· Use lip balm or cream that has sun protection factor (SPF) to protect your lips from getting sunburned. · Wear sunglasses that block UV rays. When should you call for help? Call your doctor now or seek immediate medical care if: 
   · You have signs of infection, such as: 
¨ Increased pain, swelling, warmth, or redness. ¨ Red streaks leading from the area. ¨ Pus draining from the area. ¨ A fever.  
 Watch closely for changes in your health, and be sure to contact your doctor if: 
   · You see a change in your skin, such as a growth or mole that: 
¨ Grows bigger. This may happen very slowly. ¨ Changes color. ¨ Changes shape. ¨ Starts to bleed easily.    · You have swollen glands in your armpits, groin, or neck.  
   · You do not get better as expected. Where can you learn more? Go to http://mirela-servando.info/. Enter P392 in the search box to learn more about \"Skin Cancer Prevention: Care Instructions. \" Current as of: May 12, 2017 Content Version: 11.7 © 3395-6127 [place]. Care instructions adapted under license by [employer] (which disclaims liability or warranty for this information). If you have questions about a medical condition or this instruction, always ask your healthcare professional. [address] [address] any warranty or liability for your use of this information. 
  
 
 
 
  
Introducing [facility]! Dear Lynae Favre: Thank you for requesting a mobiManage account. Our records indicate that you already have an active mobiManage account. You can access your account anytime at [email or web address] Did you know that you can access your hospital and ER discharge instructions at any time in mobiManage? You can also review all of your test results from your hospital stay or ER visit. Additional Information If you have questions, please visit the Frequently Asked Questions section of the mobiManage website at [email or web address] Remember, MyChart is NOT to be used for urgent needs. For medical emergencies, dial 911. Now available from your iPhone and Android! Please provide this summary of care documentation to your next provider. Your primary care clinician is listed as Leonides Oquendo. If you have any questions after today's visit, please call [phone].

## 2018-08-07 ENCOUNTER — TELEPHONE (OUTPATIENT)
Dept: FAMILY MEDICINE CLINIC | Age: 38
End: 2018-08-07

## 2018-08-07 NOTE — TELEPHONE ENCOUNTER
8/7/2018 10:18 AM    Called patient to discuss recent bx results. Showed dermal hemangioma. States the area has already scabbed over. A little pink surrounding. Discussed if redness worsens she has fever, chills or warmth surrounding area with purulent drainage to call back and let us know. States she will keep an eye on it and has none of these sx as this time.      Dion Noriega,   Family Med Resident, PGY2

## 2018-08-29 NOTE — PROGRESS NOTES
I was present during the critical and key portions of the procedure and immediately available to furnish services.  (Other procedure 5 minutes)

## 2018-10-17 ENCOUNTER — DOCUMENTATION ONLY (OUTPATIENT)
Dept: FAMILY MEDICINE CLINIC | Age: 38
End: 2018-10-17

## 2019-06-13 ENCOUNTER — OFFICE VISIT (OUTPATIENT)
Dept: FAMILY MEDICINE CLINIC | Age: 39
End: 2019-06-13

## 2019-06-13 VITALS
BODY MASS INDEX: 45 KG/M2 | TEMPERATURE: 98.6 F | HEART RATE: 103 BPM | DIASTOLIC BLOOD PRESSURE: 84 MMHG | WEIGHT: 254 LBS | RESPIRATION RATE: 16 BRPM | OXYGEN SATURATION: 98 % | HEIGHT: 63 IN | SYSTOLIC BLOOD PRESSURE: 121 MMHG

## 2019-06-13 DIAGNOSIS — O09.521 MULTIGRAVIDA OF ADVANCED MATERNAL AGE IN FIRST TRIMESTER: ICD-10-CM

## 2019-06-13 DIAGNOSIS — N92.6 MISSED MENSES: ICD-10-CM

## 2019-06-13 DIAGNOSIS — O10.919 HTN IN PREGNANCY, CHRONIC: ICD-10-CM

## 2019-06-13 DIAGNOSIS — O99.210 MATERNAL OBESITY AFFECTING PREGNANCY, ANTEPARTUM: ICD-10-CM

## 2019-06-13 DIAGNOSIS — Z3A.01 LESS THAN 8 WEEKS GESTATION OF PREGNANCY: Primary | ICD-10-CM

## 2019-06-13 PROBLEM — O09.529 ANTEPARTUM MULTIGRAVIDA OF ADVANCED MATERNAL AGE: Status: ACTIVE | Noted: 2019-06-13

## 2019-06-13 LAB
HCG URINE, QL. (POC): POSITIVE
VALID INTERNAL CONTROL?: YES

## 2019-06-13 NOTE — PROGRESS NOTES
HPI     Chief Complaint   Patient presents with    Missed Menses     She is a 41 y.o. female who presents for missed menses. Missed Menses  -  at 6w4d by LMP 19  - 1st pregnancy , , boy 39w0d, induced for preE, 6lb13 oz  - Hx of chronic HTN, has been checking BP 2x daily at home since dx of pregnancy and have all been WNL  - Denies VB, LOF, ctx, not feeling baby move yet (normal for GA)  - Taking PNV  - Desires genetic testing, is okay with requesting sex but does not want to know until anatomy scan    Review of Systems   Constitutional: Negative for fever and unexpected weight change. Gastrointestinal: Negative for abdominal pain. Genitourinary: Negative for vaginal bleeding. Reviewed PmHx, RxHx, FmHx, SocHx, AllgHx and updated and dated in the chart. Physical Exam:  Visit Vitals  /84 (BP 1 Location: Left arm, BP Patient Position: Sitting)   Pulse (!) 103   Temp 98.6 °F (37 °C) (Oral)   Resp 16   Ht 5' 3\" (1.6 m)   Wt 254 lb (115.2 kg)   LMP 2019   SpO2 98%   BMI 44.99 kg/m²     Physical Exam   Constitutional: She appears well-developed and well-nourished. No distress. Cardiovascular: Normal rate, regular rhythm and normal heart sounds. Exam reveals no gallop and no friction rub. No murmur heard. Pulmonary/Chest: Effort normal and breath sounds normal. No stridor. No respiratory distress. She has no wheezes. She has no rales. Skin: Skin is warm and dry. She is not diaphoretic. Psychiatric: She has a normal mood and affect. Her behavior is normal.   Nursing note and vitals reviewed.     UPT neg  Recent Results (from the past 12 hour(s))   AMB POC URINE PREGNANCY TEST, VISUAL COLOR COMPARISON    Collection Time: 19  9:16 AM   Result Value Ref Range    VALID INTERNAL CONTROL POC Yes     HCG urine, Ql. (POC) Positive Negative          Assessment / Plan     Pregnancy at 6w4d by LMP  - Continue PNV  - Dating US scheduled 7/15 with Dr. Lynn Chavez  - Initial prenatal 7/17 with Dr. Verena Dennis  - Given chronic HTN and hx of PrE will need to start ASA 81mg at 12 weeks, needs EKG, 24 hr TUP, Pr/Cr, CMP in addition to prenatal labs  - BP elevated on first check today, WNL on 2nd check, will likely not start meds until SBP >150 (discussed with Dr. Rodrigue Millard)  - Will need early GTT testing/ HgA1c given maternal obesity  - Will do Panorama at initial visit with fetal sex reported however will not relay these results to Mom as she does not want to know them until the US    Follow up for initial prenatal     I have discussed the diagnosis with the patient and the intended plan as seen in the above orders. The patient has received an after-visit summary and questions were answered concerning future plans. I have discussed medication side effects and warnings with the patient as well.     Patient discussed with Dr. Rodrigue Millard (Attending)    Ifrah Sykes DO  Family Medicine Resident

## 2019-06-13 NOTE — PATIENT INSTRUCTIONS
Weeks 6 to 10 of Your Pregnancy: Care Instructions  Your Care Instructions    Congratulations on your pregnancy. This is an exciting and important time for you. During the first 6 to 10 weeks of your pregnancy, your body goes through many changes. Your baby grows very fast, even though you cannot feel it yet. You may start to notice that you feel different, both in your body and your emotions. Because each woman's pregnancy is unique, there is no right way to feel. You may feel the healthiest you have ever been, or you may feel tired or sick to your stomach (\"morning sickness\"). These early weeks are a time to make healthy choices and to eat the best foods for you and your baby. This care sheet will give you some ideas. This is also a good time to think about birth defects testing. These are tests done during pregnancy to look for possible problems with the baby. First trimester tests for birth defects can be done between 8 and 17 weeks of pregnancy, depending on the test. Talk with your doctor about what kinds of tests are available. Follow-up care is a key part of your treatment and safety. Be sure to make and go to all appointments, and call your doctor if you are having problems. It's also a good idea to know your test results and keep a list of the medicines you take. How can you care for yourself at home? Eat well  · Eat at least 3 meals and 2 healthy snacks every day. Eat fresh, whole foods, including:  ? 7 or more servings of bread, tortillas, cereal, rice, pasta, or oatmeal.  ? 3 or more servings of vegetables, especially leafy green vegetables. ? 2 or more servings of fruits. ? 3 or more servings of milk, yogurt, or cheese. ? 2 or more servings of meat, turkey, chicken, fish, eggs, or dried beans. · Drink plenty of fluids, especially water. Avoid sodas and other sweetened drinks. · Choose foods that have important vitamins for your baby, such as calcium, iron, and folate.   ? Dairy products, tofu, canned fish with bones, almonds, broccoli, dark leafy greens, corn tortillas, and fortified orange juice are good sources of calcium. ? Beef, poultry, liver, spinach, lentils, dried beans, fortified cereals, and dried fruits are rich in iron. ? Dark leafy greens, broccoli, asparagus, liver, fortified cereals, orange juice, peanuts, and almonds are good sources of folate. · Avoid foods that could harm your baby. ? Do not eat raw or undercooked meat, chicken, or fish (such as sushi or raw oysters). ? Do not eat raw eggs or foods that contain raw eggs, such as Caesar dressing. ? Do not eat soft cheeses and unpasteurized dairy foods, such as Brie, feta, or blue cheese. ? Do not eat fish that contains a lot of mercury, such as shark, swordfish, tilefish, or seferino mackerel. Do not eat more than 6 ounces of tuna each week. ? Do not eat raw sprouts, especially alfalfa sprouts. ? Cut down on caffeine, such as coffee, tea, and cola. Protect yourself and your baby  · Do not touch eduardo litter or cat feces. They can cause an infection that could harm your baby. · High body temperature can be harmful to your baby. So if you want to use a sauna or hot tub, be sure to talk to your doctor about how to use it safely. [place] with morning sickness  · Sip small amounts of water, juices, or shakes. Try drinking between meals, not with meals. · Eat 5 or 6 small meals a day. Try dry toast or crackers when you first get up, and eat breakfast a little later. · Avoid spicy, greasy, and fatty foods. · When you feel sick, open your windows or go for a short walk to get fresh air. · Try nausea wristbands. These help some women. · Tell your doctor if you think your prenatal vitamins make you sick. Where can you learn more? Go to http://halina.info/. Enter G112 in the search box to learn more about \"Weeks 6 to 10 of Your Pregnancy: Care Instructions. \"  Current as of: September 5, 2018  Content Version: 11.9  © 2166-8962 [employer]. Care instructions adapted under license by [employer] (which disclaims liability or warranty for this information). If you have questions about a medical condition or this instruction, always ask your healthcare professional. [address] any warranty or liability for your use of this information.      About When to Call Your Doctor During Pregnancy (Up to 20 Weeks)  Your Care Instructions    It's common to have concerns about what might be a problem during pregnancy. Although most pregnant women don't have any serious problems, it's important to know when to call your doctor if you have certain symptoms. These are general suggestions. Your doctor may give you some more information about when to call. When to call your doctor (up to 20 weeks)  Call 911 anytime you think you may need emergency care. For example, call if:  · You passed out (lost consciousness). Call your doctor now or seek immediate medical care if:  · You have a fever. · You have vaginal bleeding. · You are dizzy or lightheaded, or you feel like you may faint. · You have symptoms of a urinary tract infection. These may include:  ? Pain or burning when you urinate. ? A frequent need to urinate without being able to pass much urine. ? Pain in the flank, which is just below the rib cage and above the waist on either side of the back. ? Blood in your urine. · You have belly pain. · You think you are having contractions. · You have a sudden release of fluid from your vagina. Watch closely for changes in your health, and be sure to contact your doctor if:  · You have vaginal discharge that smells bad. · You have other concerns about your pregnancy. Follow-up care is a key part of your treatment and safety. Be sure to make and go to all appointments, and call your doctor if you are having problems.  It's also a good idea to know your test results and keep a list of the medicines you take. Where can you learn more? Go to http://mirela-servando.info/. Enter F770 in the search box to learn more about \"Learning About When to Call Your Doctor During Pregnancy (Up to 20 Weeks). \"  Current as of: September 5, 2018  Content Version: 11.9  © 6717-1699 [facility]. Care instructions adapted under license by [employer] (which disclaims liability or warranty for this information).  If you have questions about a medical condition or this instruction, always ask your healthcare professional. [address] any warranty or liability for your use of this information.

## 2019-06-13 NOTE — PROGRESS NOTES
Chief Complaint   Patient presents with    Missed Menses     1. Have you been to the ER, urgent care clinic since your last visit? Hospitalized since your last visit? No    2. Have you seen or consulted any other health care providers outside of the [facility] since your last visit? Include any pap smears or colon screening.  No

## 2019-06-25 ENCOUNTER — HOSPITAL ENCOUNTER (EMERGENCY)
Age: 39
Discharge: HOME | End: 2019-06-25
Attending: EMERGENCY MEDICINE
Payer: OTHER GOVERNMENT

## 2019-06-25 ENCOUNTER — APPOINTMENT (OUTPATIENT)
Dept: ULTRASOUND IMAGING | Age: 39
End: 2019-06-25
Attending: EMERGENCY MEDICINE
Payer: OTHER GOVERNMENT

## 2019-06-25 ENCOUNTER — OFFICE VISIT (OUTPATIENT)
Dept: FAMILY MEDICINE CLINIC | Age: 39
End: 2019-06-25

## 2019-06-25 ENCOUNTER — TELEPHONE (OUTPATIENT)
Dept: FAMILY MEDICINE CLINIC | Age: 39
End: 2019-06-25

## 2019-06-25 VITALS
WEIGHT: 255 LBS | HEART RATE: 112 BPM | BODY MASS INDEX: 45.18 KG/M2 | RESPIRATION RATE: 18 BRPM | TEMPERATURE: 99 F | SYSTOLIC BLOOD PRESSURE: 126 MMHG | OXYGEN SATURATION: 98 % | HEIGHT: 63 IN | DIASTOLIC BLOOD PRESSURE: 70 MMHG

## 2019-06-25 VITALS
WEIGHT: 248 LBS | DIASTOLIC BLOOD PRESSURE: 95 MMHG | TEMPERATURE: 99.2 F | BODY MASS INDEX: 43.94 KG/M2 | HEIGHT: 63 IN | RESPIRATION RATE: 18 BRPM | SYSTOLIC BLOOD PRESSURE: 168 MMHG | HEART RATE: 120 BPM | OXYGEN SATURATION: 97 %

## 2019-06-25 DIAGNOSIS — O03.9 MISCARRIAGE: Primary | ICD-10-CM

## 2019-06-25 DIAGNOSIS — Z34.90 PREGNANCY, UNSPECIFIED GESTATIONAL AGE: ICD-10-CM

## 2019-06-25 DIAGNOSIS — O20.0 THREATENED ABORTION: Primary | ICD-10-CM

## 2019-06-25 PROBLEM — E66.01 OBESITY, MORBID (HCC): Status: ACTIVE | Noted: 2019-06-25

## 2019-06-25 LAB
ALBUMIN SERPL-MCNC: 3.9 G/DL (ref 3.5–5)
ALBUMIN/GLOB SERPL: 1 {RATIO} (ref 1.1–2.2)
ALP SERPL-CCNC: 71 U/L (ref 45–117)
ALT SERPL-CCNC: 39 U/L (ref 12–78)
ANION GAP SERPL CALC-SCNC: 7 MMOL/L (ref 5–15)
AST SERPL-CCNC: 19 U/L (ref 15–37)
BASOPHILS # BLD: 0.1 K/UL (ref 0–0.1)
BASOPHILS NFR BLD: 1 % (ref 0–1)
BILIRUB SERPL-MCNC: 0.7 MG/DL (ref 0.2–1)
BILIRUB UR QL STRIP: NEGATIVE
BUN SERPL-MCNC: 15 MG/DL (ref 6–20)
BUN/CREAT SERPL: 18 (ref 12–20)
CALCIUM SERPL-MCNC: 8.9 MG/DL (ref 8.5–10.1)
CHLORIDE SERPL-SCNC: 108 MMOL/L (ref 97–108)
CO2 SERPL-SCNC: 25 MMOL/L (ref 21–32)
COMMENT, HOLDF: NORMAL
CREAT SERPL-MCNC: 0.84 MG/DL (ref 0.55–1.02)
DIFFERENTIAL METHOD BLD: ABNORMAL
EOSINOPHIL # BLD: 0.2 K/UL (ref 0–0.4)
EOSINOPHIL NFR BLD: 2 % (ref 0–7)
ERYTHROCYTE [DISTWIDTH] IN BLOOD BY AUTOMATED COUNT: 13.5 % (ref 11.5–14.5)
GLOBULIN SER CALC-MCNC: 3.9 G/DL (ref 2–4)
GLUCOSE SERPL-MCNC: 115 MG/DL (ref 65–100)
GLUCOSE UR-MCNC: NEGATIVE MG/DL
HCT VFR BLD AUTO: 43.2 % (ref 35–47)
HGB BLD-MCNC: 14.6 G/DL (ref 11.5–16)
IMM GRANULOCYTES # BLD AUTO: 0 K/UL (ref 0–0.04)
IMM GRANULOCYTES NFR BLD AUTO: 0 % (ref 0–0.5)
KETONES P FAST UR STRIP-MCNC: NEGATIVE MG/DL
LYMPHOCYTES # BLD: 2 K/UL (ref 0.8–3.5)
LYMPHOCYTES NFR BLD: 19 % (ref 12–49)
MCH RBC QN AUTO: 28.1 PG (ref 26–34)
MCHC RBC AUTO-ENTMCNC: 33.8 G/DL (ref 30–36.5)
MCV RBC AUTO: 83.2 FL (ref 80–99)
MONOCYTES # BLD: 0.8 K/UL (ref 0–1)
MONOCYTES NFR BLD: 8 % (ref 5–13)
NEUTS SEG # BLD: 7.6 K/UL (ref 1.8–8)
NEUTS SEG NFR BLD: 70 % (ref 32–75)
NRBC # BLD: 0 K/UL (ref 0–0.01)
NRBC BLD-RTO: 0 PER 100 WBC
PH UR STRIP: 6.5 [PH] (ref 4.6–8)
PLATELET # BLD AUTO: 405 K/UL (ref 150–400)
PMV BLD AUTO: 9.2 FL (ref 8.9–12.9)
POTASSIUM SERPL-SCNC: 3.7 MMOL/L (ref 3.5–5.1)
PROT SERPL-MCNC: 7.8 G/DL (ref 6.4–8.2)
PROT UR QL STRIP: NEGATIVE
RBC # BLD AUTO: 5.19 M/UL (ref 3.8–5.2)
SAMPLES BEING HELD,HOLD: NORMAL
SODIUM SERPL-SCNC: 140 MMOL/L (ref 136–145)
SP GR UR STRIP: 1 (ref 1–1.03)
UA UROBILINOGEN AMB POC: NORMAL (ref 0.2–1)
URINALYSIS CLARITY POC: CLEAR
URINALYSIS COLOR POC: YELLOW
URINE BLOOD POC: NEGATIVE
URINE LEUKOCYTES POC: NEGATIVE
URINE NITRITES POC: NEGATIVE
WBC # BLD AUTO: 10.6 K/UL (ref 3.6–11)

## 2019-06-25 PROCEDURE — 76817 TRANSVAGINAL US OBSTETRIC: CPT

## 2019-06-25 PROCEDURE — 99281 EMR DPT VST MAYX REQ PHY/QHP: CPT

## 2019-06-25 PROCEDURE — 80053 COMPREHEN METABOLIC PANEL: CPT

## 2019-06-25 PROCEDURE — 76801 OB US < 14 WKS SINGLE FETUS: CPT

## 2019-06-25 PROCEDURE — 36415 COLL VENOUS BLD VENIPUNCTURE: CPT

## 2019-06-25 PROCEDURE — 85025 COMPLETE CBC W/AUTO DIFF WBC: CPT

## 2019-06-25 NOTE — PROGRESS NOTES
I reviewed with the resident the medical history and the resident's findings on the physical examination. I discussed with the resident the patient's diagnosis and concur with the plan. Nydia Dalton is a 41 y.o. female  at ~8 weeks by LMP. Due for dating ultrasound 7/15. Concerns addressed: Concern for 1st trimester bleeding. Pregnancy complicated by: Morbid Obesity, history of Pre-E, Hx of chronic hypertension. Denies VB, LOF, Contractions. No Fetal movement to date. Weight gain reviewed. RTC in 1 week    Visit Vitals  /70 (BP 1 Location: Left arm, BP Patient Position: Sitting)   Pulse (!) 112   Temp 99 °F (37.2 °C) (Oral)   Resp 18   Ht 5' 3\" (1.6 m)   Wt 255 lb (115.7 kg)   SpO2 98%   BMI 45.17 kg/m²     FHT: early in pregnancy  FH: early in pregnancy    Recent Results (from the past 24 hour(s))   AMB POC URINALYSIS DIP STICK AUTO W/O MICRO    Collection Time: 19 10:23 AM   Result Value Ref Range    Color (UA POC) Yellow     Clarity (UA POC) Clear     Glucose (UA POC) Negative Negative    Bilirubin (UA POC) Negative Negative    Ketones (UA POC) Negative Negative    Specific gravity (UA POC) 1.005 1.001 - 1.035    Blood (UA POC) Negative Negative    pH (UA POC) 6.5 4.6 - 8.0    Protein (UA POC) Negative Negative    Urobilinogen (UA POC) 0.2 mg/dL 0.2 - 1    Nitrites (UA POC) Negative Negative    Leukocyte esterase (UA POC) Negative Negative         OB Labs reviewed. Brief Summary Below.   No results found for: RUBELLAEXT, GRBSEXT, HBSAGEXT, HIVEXT, RPREXT, TPALEXT, GONNOEXT, CHLAMEXT, [id number], [id number]

## 2019-06-25 NOTE — PATIENT INSTRUCTIONS
Threatened Miscarriage: Care Instructions  Your Care Instructions    Some women have light spotting or bleeding during the first 12 weeks of pregnancy. In some cases this is normal. Light spotting or bleeding can also be a sign of a possible loss of the pregnancy. This is called a threatened miscarriage. At this point, the doctor may not be able to tell if your vaginal bleeding is normal or is a sign of a miscarriage. In early pregnancy, things such as stress, exercise, and sex do not cause miscarriage. You may be worried or upset about the possibility of losing your pregnancy. But do not blame yourself. There is no treatment to stop a threatened miscarriage. If you do have a miscarriage, there was nothing you could have done to prevent it. A miscarriage usually means that the pregnancy is not developing normally. The doctor has checked you carefully, but problems can develop later. If you notice any problems or new symptoms, get medical treatment right away. Follow-up care is a key part of your treatment and safety. Be sure to make and go to all appointments, and call your doctor if you are having problems. It's also a good idea to know your test results and keep a list of the medicines you take. How can you care for yourself at home? · If you do have a miscarriage, you will probably have some vaginal bleeding for 1 to 2 weeks. Use pads instead of tampons. · Take acetaminophen (Tylenol) for cramps. Read and follow all instructions on the label. · Do not take two or more pain medicines at the same time unless the doctor told you to. Many pain medicines have acetaminophen, which is Tylenol. Too much acetaminophen (Tylenol) can be harmful. · Do not have sex until your doctor says it is okay. · Get lots of rest over the next several days. · You may do your normal activities if you feel well enough to do them. But do not do any heavy exercise until your doctor says it is okay.   · Eat a balanced diet that is high in iron and vitamin C. Foods rich in iron include red meat, shellfish, eggs, beans, and leafy green vegetables. Foods high in vitamin C include citrus fruits, tomatoes, and broccoli. Talk to your doctor about whether you need to take iron pills or a multivitamin. · Do not drink alcohol or use tobacco or illegal drugs. · Do not smoke. If you need help quitting, talk to your doctor about stop-smoking programs and medicines. These can increase your chances of quitting for good. When should you call for help? Call 911 anytime you think you may need emergency care. For example, call if:    · You passed out (lost consciousness).    Call your doctor now or seek immediate medical care if:    · You have severe vaginal bleeding.     · You are dizzy or lightheaded, or you feel like you may faint.     · You have new or worse pain in your belly or pelvis.     · You have a fever.     · You have vaginal discharge that smells bad.    Watch closely for changes in your health, and be sure to contact your doctor if:    · You do not get better as expected. Where can you learn more? Go to http://mirela-servando.info/. Enter Q346 in the search box to learn more about \"Threatened Miscarriage: Care Instructions. \"  Current as of: September 5, 2018  Content Version: 11.9  © 7066-3007 [employer]. Care instructions adapted under license by [employer] (which disclaims liability or warranty for this information). If you have questions about a medical condition or this instruction, always ask your healthcare professional. [place] [address] any warranty or liability for your use of this information.

## 2019-06-25 NOTE — PROGRESS NOTES
6/25/2019   Chief Complaint   Patient presents with    Other     8 week pregnant notice spotting when wipe last night       HPI:  Flori Blair is a 41 y.o. female who presents to clinic today for spotting last night around 8:30 PM, spotting only occurred when wiping, no clots or dripping into toilet bowl, but has been consistent up until present. She describes as more mucous, looks pinkish-red. Denies abd pain or cramping    Patients past medical, surgical and family histories were reviewed. Allergies and Medications reviewed and updated. Current Outpatient Medications:     prenatal vit-iron fumarate-fa 27 mg iron- 0.8 mg tab tablet, Take 1 Tab by mouth daily. , Disp: , Rfl:   History reviewed. No pertinent past medical history. Review of Systems -     (Positive ROS in BOLD)    Constitutional: fever, chills, fatigue  Respiratory: cough, shortness of breath or wheezing  Cardiovascular: chest pain or palpitations      Vitals:  Vitals:    06/25/19 1001 06/25/19 1040   BP: 141/87 126/70   Pulse: (!) 114 (!) 112   Resp: 18    Temp: 99 °F (37.2 °C)    TempSrc: Oral    SpO2: 98%    Weight: 255 lb (115.7 kg)    Height: 5' 3\" (1.6 m)      Body mass index is 45.17 kg/m². Objective  General: Patient in NAD, sitting comfortably  HEENT: Atraumatic, normocephalic, no scleral icterus  Cardiovascular: Regular rate and rhythm, No murmurs, rubs or gallops. No LE edema  Respiratory: Lungs clear to auscultation bilaterally, no wheezing, rales or rhonchi. No accessory muscle use. GI:  Normoactive BS. No TTP in all 4 quadrants  :  Performed with assistance from Dr. Placido Cushing. Normal vaginal exam, cervix looked to be closed, some areas of non-oozing blood noted near cervix. Chaperoned by nurse Guanakito Shell        No results found.    Recent Results (from the past 24 hour(s))   AMB POC URINALYSIS DIP STICK AUTO W/O MICRO    Collection Time: 06/25/19 10:23 AM   Result Value Ref Range    Color (UA POC) Yellow     Clarity (UA POC) Clear     Glucose (UA POC) Negative Negative    Bilirubin (UA POC) Negative Negative    Ketones (UA POC) Negative Negative    Specific gravity (UA POC) 1.005 1.001 - 1.035    Blood (UA POC) Negative Negative    pH (UA POC) 6.5 4.6 - 8.0    Protein (UA POC) Negative Negative    Urobilinogen (UA POC) 0.2 mg/dL 0.2 - 1    Nitrites (UA POC) Negative Negative    Leukocyte esterase (UA POC) Negative Negative       Assessment and Plan:  1. Threatened   Base don bleeding and exam, showing closed cervical os, will have patient monitor for further bleeding, abd pain and let clinic know if this occurs. Discussed that her blood type will need to be checked, to see if she needs rhogam within 72 hours (would be 8:30PM on Thursday). Will call patient if she is Rh Neg and have her come to clinic for rhogam shot. Will check HCG and monior serial quants if needed. Jacqlyn Has may be due to mild dehydration, discussed increasing fluids, no shortness of breath, normal O2 sat, should return to clinic if she has any of the above symptoms    - BLOOD TYPE, (ABO+RH)  - BETA HCG, QT    2. Pregnancy, unspecified gestational age    - AMB POC URINALYSIS DIP STICK AUTO W/O MICRO      I have discussed the diagnosis with the patient and the intended plan as seen in the above orders. she has expressed understanding. The patient has received an after-visit summary and questions were answered concerning future plans. I have discussed medication side effects and warnings with the patient as well. Follow-up and Dispositions    · Return in about 1 week (around 2019).          Electronically Signed: Ashley Bolton MD

## 2019-06-25 NOTE — TELEPHONE ENCOUNTER
Patient called stating she is pregnant and having some spotting. She is crying an concerned. Call transferred to nurse Sophia Busby.

## 2019-06-25 NOTE — ED TRIAGE NOTES
Patient states that she feels she is having a miscarriage. Started with light bleeding yesterday. Saw her OB yesterday and had blood work done. States she got home today and felt a gush of blood. Is still bleeding, states that she is approx 8 weeks. This is her second pregnancy. Is feeling that she \"is on my period\", other than that, no pain.

## 2019-06-25 NOTE — PROGRESS NOTES
Chief Complaint   Patient presents with    Other     8 week pregnant notice spotting when wipe last night     1. Have you been to the ER, urgent care clinic since your last visit? Hospitalized since your last visit? No    2. Have you seen or consulted any other health care providers outside of the [address] [place] [address] since your last visit? Include any pap smears or colon screening. No     Reviewed record in preparation for visit and have obtained necessary documentation.

## 2019-06-25 NOTE — ED PROVIDER NOTES
I have evaluated the patient as the Provider in Triage. I have reviewed Her vital signs and the triage nurse assessment. I have talked with the patient and any available family and advised that I am the provider in triage and have ordered the appropriate study to initiate their work up based on the clinical presentation during my assessment. I have advised that the patient will be accommodated in the Main ED as soon as possible. I have also requested to contact the triage nurse or myself immediately if the patient experiences any changes in their condition during this brief waiting period. 41 y.o. female with no significant past medical history who presents from home with chief complaint of vaginal bleeding. Pt complains of vaginal bleeding with clots that started yesterday and has increased today. Pt states she saw her OB today and had lab work done. Pt states after she left the OB office she has had constant, severe bleeding and abdominal cramping since. Pt notes she is 8 weeks pregnant and . Pt notes she has preeclampsia. Pt denies taking anticoagulants. There are no other acute medical concerns at this time. Social hx: Never Smoker. Denies EtOH Use. PCP: Sheree Harden DO    Note written by Marylyn Cogan, Scribe, as dictated by Mirella Stiles MD 7:31 PM      The history is provided by the patient. No past medical history on file. No past surgical history on file.       Family History:   Problem Relation Age of Onset    Hypertension Mother     Cancer Mother     Breast Cancer Mother 35    Diabetes Father     Cancer Maternal Grandmother     Diabetes Paternal Grandmother        Social History     Socioeconomic History    Marital status:      Spouse name: Not on file    Number of children: Not on file    Years of education: Not on file    Highest education level: Not on file   Occupational History    Not on file   Social Needs    Financial resource strain: Not on file    Food insecurity:     Worry: Not on file     Inability: Not on file    Transportation needs:     Medical: Not on file     Non-medical: Not on file   Tobacco Use    Smoking status: Never Smoker    Smokeless tobacco: Never Used   Substance and Sexual Activity    Alcohol use: No    Drug use: No    Sexual activity: Yes     Partners: Male     Birth control/protection: None   Lifestyle    Physical activity:     Days per week: Not on file     Minutes per session: Not on file    Stress: Not on file   Relationships    Social connections:     Talks on phone: Not on file     Gets together: Not on file     Attends Zoroastrian service: Not on file     Active member of club or organization: Not on file     Attends meetings of clubs or organizations: Not on file     Relationship status: Not on file    Intimate partner violence:     Fear of current or ex partner: Not on file     Emotionally abused: Not on file     Physically abused: Not on file     Forced sexual activity: Not on file   Other Topics Concern    Not on file   Social History Narrative    Not on file         ALLERGIES: Penicillins    Review of Systems   Constitutional: Negative for activity change, chills and fever. HENT: Negative for nosebleeds, sore throat, trouble swallowing and voice change. Eyes: Negative for visual disturbance. Respiratory: Negative for shortness of breath. Cardiovascular: Negative for chest pain and palpitations. Gastrointestinal: Positive for abdominal pain. Negative for constipation, diarrhea and nausea. Genitourinary: Positive for vaginal bleeding. Negative for difficulty urinating, dysuria, hematuria and urgency. Musculoskeletal: Negative for back pain, neck pain and neck stiffness. Skin: Negative for color change. Allergic/Immunologic: Negative for immunocompromised state. Neurological: Negative for dizziness, seizures, syncope, weakness, light-headedness, numbness and headaches. Psychiatric/Behavioral: Negative for behavioral problems, confusion, hallucinations, self-injury and suicidal ideas. All other systems reviewed and are negative. Vitals:    19 1933   BP: (!) 168/95   Pulse: (!) 120   Resp: 18   Temp: 99.2 °F (37.3 °C)   SpO2: 97%   Weight: 112.5 kg (248 lb)   Height: 5' 3\" (1.6 m)            Physical Exam   Constitutional: She appears well-developed and well-nourished. No distress. HENT:   Head: Atraumatic. Eyes: EOM are normal.   Neck: No tracheal deviation present. Cardiovascular:   Warm and well perfused   Pulmonary/Chest: Effort normal. No respiratory distress. Musculoskeletal: Normal range of motion. Neurological: She is alert. Coordination normal.   Skin: Skin is warm and dry. She is not diaphoretic. Psychiatric: She has a normal mood and affect. Her behavior is normal. Judgment and thought content normal.   Nursing note and vitals reviewed. Note written by Alvina Sandhoff. Leyla Bloom, as dictated by Juve Millard. Jamie Chaparro MD 7:33 PM      MDM     44YOF  at approximately 8 weeks gestation by LMP presenting with complaints of increased vaginal bleeding since being seen by PCP this morning for the same. Patient states passing clots, minimal abdominal pain, denies CP or SOB, noted to be tearful, tachycardic. No abdominal TTP, CTAB. Suspect threatened vs. missed AB. Will obtain US, lab work, reassess and make a disposition. Procedures     Hb unchanged, HR improved, no IUP on US, possible retailed products of conception. Bleeding similar to menses. Will DC home with likely missed AB, provide return precautions, OB f/u in the morning.

## 2019-06-26 ENCOUNTER — TELEPHONE (OUTPATIENT)
Dept: FAMILY MEDICINE CLINIC | Age: 39
End: 2019-06-26

## 2019-06-26 LAB
ABO GROUP BLD: NORMAL
HCG INTACT+B SERPL-ACNC: NORMAL MIU/ML
RH BLD: POSITIVE

## 2019-06-26 NOTE — DISCHARGE INSTRUCTIONS
Patient Education        Miscarriage: Care Instructions  Your Care Instructions    The loss of a pregnancy can be very hard. You may wonder why it happened or blame yourself. Miscarriages are common and are not caused by exercise, stress, or sex. Most happen because the fertilized egg in the uterus does not develop normally. There is no treatment that can stop a miscarriage. As long as you do not have heavy blood loss, fever, weakness, or other signs of infection, you can let a miscarriage follow its own course. This can take several days. Your body will recover over the next several weeks. Having a miscarriage does not mean you cannot have a normal pregnancy in the future. The doctor has checked you carefully, but problems can develop later. If you notice any problems or new symptoms, get medical treatment right away. Follow-up care is a key part of your treatment and safety. Be sure to make and go to all appointments, and call your doctor if you are having problems. It's also a good idea to know your test results and keep a list of the medicines you take. How can you care for yourself at home? · You will probably have some vaginal bleeding for 1 to 2 weeks. It may be similar to or slightly heavier than a normal period. The bleeding should get lighter after a week. Use pads instead of tampons. You may use tampons during your next period, which should start in 3 to 6 weeks. · Take an over-the-counter pain medicine, such as acetaminophen (Tylenol), ibuprofen (Advil, Motrin), or naproxen (Aleve) for cramps. Read and follow all instructions on the label. You may have cramps for several days after the miscarriage. · Do not take two or more pain medicines at the same time unless the doctor told you to. Many pain medicines have acetaminophen, which is Tylenol. Too much acetaminophen (Tylenol) can be harmful. · Use a clear container to save any tissue that you pass.  Take it to your doctor's office as soon as you can.  · Do not have sex until the bleeding stops. · You may return to your normal activities if you feel well enough to do so. But you should avoid heavy exercise until the bleeding stops. · If you plan to get pregnant again, check with your doctor. Most doctors suggest waiting until you have had at least one normal period before you try to get pregnant. · If you do not want to get pregnant, ask your doctor about birth control. You can get pregnant again before your next period starts if you are not using birth control. · You may be low in iron because of blood loss. Eat a balanced diet that is high in iron and vitamin C. Foods rich in iron include red meat, shellfish, eggs, beans, and leafy green vegetables. Foods high in vitamin C include citrus fruits, tomatoes, and broccoli. Talk to your doctor about whether you need to take iron pills or a multivitamin. · The loss of a pregnancy can be very hard. You may wonder why it happened and blame yourself. Talking to family members, friends, a counselor, or your doctor may help you cope with your loss. When should you call for help? Call 911 anytime you think you may need emergency care. For example, call if:    · You passed out (lost consciousness).    Call your doctor now or seek immediate medical care if:    · You have severe vaginal bleeding.     · You are dizzy or lightheaded, or you feel like you may faint.     · You have new or worse pain in your belly or pelvis.     · You have a fever.     · You have vaginal discharge that smells bad.    Watch closely for changes in your health, and be sure to contact your doctor if:    · You do not get better as expected. Where can you learn more? Go to http://mirela-servando.info/. Enter E802 in the search box to learn more about \"Miscarriage: Care Instructions. \"  Current as of: September 5, 2018  Content Version: 11.9  © 1389-2133 [employer].  Care instructions adapted under license by [employer] (which disclaims liability or warranty for this information). If you have questions about a medical condition or this instruction, always ask your healthcare professional. [address] any warranty or liability for your use of this information.

## 2019-06-26 NOTE — TELEPHONE ENCOUNTER
Called patient, left  for patient to call back. Patient's blood type is A+, so she does NOT need Rhogam shot.   Another HCG level should be done in 1 week to see if it is rising or falling at her next visit

## 2019-07-01 ENCOUNTER — OFFICE VISIT (OUTPATIENT)
Dept: FAMILY MEDICINE CLINIC | Age: 39
End: 2019-07-01

## 2019-07-01 VITALS
TEMPERATURE: 98.7 F | RESPIRATION RATE: 18 BRPM | HEART RATE: 103 BPM | HEIGHT: 63 IN | SYSTOLIC BLOOD PRESSURE: 148 MMHG | BODY MASS INDEX: 45.18 KG/M2 | OXYGEN SATURATION: 99 % | WEIGHT: 255 LBS | DIASTOLIC BLOOD PRESSURE: 85 MMHG

## 2019-07-01 DIAGNOSIS — O20.0 THREATENED ABORTION: Primary | ICD-10-CM

## 2019-07-01 DIAGNOSIS — O03.9 MISCARRIAGE: ICD-10-CM

## 2019-07-01 NOTE — PROGRESS NOTES
Chief Complaint   Patient presents with   [facility] Follow Up     pt seen in ED on 06/25/2019 for miscarriage      1. Have you been to the ER, urgent care clinic since your last visit? Hospitalized since your last visit? Yes When: 06/25/2019 Where: [facility] Reason for visit: miscarriage    2. Have you seen or consulted any other health care providers outside of the [address] [place] [address] since your last visit? Include any pap smears or colon screening.  No

## 2019-07-01 NOTE — PROGRESS NOTES
Subjective   Juliana Reed is an 41 y.o. female with known HTN presents for follow up of spontaneous . Patient was evaluated in the ED on 19, presented with severe vaginal bleeding with clots and abdominal cramping. At the time patient was 8 weeks pregnant. In the ED abdominal ultrasound showed no evidence of intrauterine gestation. Patient states vaginal bleeding has significant decreased and is equal to menses. Denies any abdominal cramping, fever, chills, foul smelling vaginal discharge. Patient is tearful when talking about miscarriage. She had been trying to become pregnant for years with  and was excited about this pregnancy. Patient understands that the most common cause of spontaneous  are congenital malformations. Has great family support. Review of Systems   Review of Systems   Constitutional: Negative for chills, fever and malaise/fatigue. Cardiovascular: Negative for palpitations. Gastrointestinal: Negative for abdominal pain, nausea and vomiting. Genitourinary: Negative for dysuria. Skin: Negative for rash. Psychiatric/Behavioral: Negative for depression and suicidal ideas. Allergies   Allergies   Allergen Reactions    Penicillins Rash       Medications  Current Outpatient Medications   Medication Sig    prenatal vit-iron fumarate-fa 27 mg iron- 0.8 mg tab tablet Take 1 Tab by mouth daily. No current facility-administered medications for this visit. Medical History  History reviewed. No pertinent past medical history. Immunizations     There is no immunization history on file for this patient. History reviewed. No pertinent surgical history.   Family History   Problem Relation Age of Onset    Hypertension Mother     Cancer Mother     Breast Cancer Mother 35    Diabetes Father     Cancer Maternal Grandmother     Diabetes Paternal Grandmother      Social History     Tobacco Use    Smoking status: Never Smoker    Smokeless tobacco: Never Used   Substance Use Topics    Alcohol use: No       Objective   Vital Signs  Visit Vitals  /85 (BP 1 Location: Left arm, BP Patient Position: Sitting)   Pulse (!) 103   Temp 98.7 °F (37.1 °C) (Oral)   Resp 18   Ht 5' 3\" (1.6 m)   Wt 255 lb (115.7 kg)   LMP 2019   SpO2 99%   BMI 45.17 kg/m²       Physical Exam  General appearance - Alert, NAD, tearful  Heart - Normal rate, regular rhythm. No m/r/r  Abdomen - Soft, non tender. Non distended. Neurological - No focal deficits. Speech normal.   Musculoskeletal - Normal ROM, Gait normal.    Extremities - No LE edema. Distal pulses intact  Skin - normal coloration and normal turgor. No cyanosis, no rash. Kalli Garrett is a 41 y.o. who presents for follow up on spontaneous . Plan   1. Complete : US on 19 with no evidence of intrauterine gestation after patient had passed large amount of clots and vaginal bleeding.   - Will not order follow up ultrasound given vaginal bleeding has significantly decreased and patient is not having abdominal cramping.   - Patient was counseled on warning signs such as chills, fever, worsening bleeding and when to seek emergent medical assistance. - beta HCG ordered. - If vaginal bleeding persists beyond two weeks after uterine evacuation retained products of conception should be suspected and may need D&C   - Follow up in 1 week. I have discussed the aforementioned diagnoses and plan with the patient in detail. I have provided information in person and/or in AVS. All questions answered prior to discharge.       I discussed this patient with Dr. Lisa Martinez (Attending Physician)   Signed By:  Easton Herman MD    Family Medicine Resident

## 2019-07-01 NOTE — PATIENT INSTRUCTIONS
Miscarriage: Care Instructions  Your Care Instructions    The loss of a pregnancy can be very hard. You may wonder why it happened or blame yourself. Miscarriages are common and are not caused by exercise, stress, or sex. Most happen because the fertilized egg in the uterus does not develop normally. There is no treatment that can stop a miscarriage. As long as you do not have heavy blood loss, fever, weakness, or other signs of infection, you can let a miscarriage follow its own course. This can take several days. Your body will recover over the next several weeks. Having a miscarriage does not mean you cannot have a normal pregnancy in the future. The doctor has checked you carefully, but problems can develop later. If you notice any problems or new symptoms, get medical treatment right away. Follow-up care is a key part of your treatment and safety. Be sure to make and go to all appointments, and call your doctor if you are having problems. It's also a good idea to know your test results and keep a list of the medicines you take. How can you care for yourself at home? · You will probably have some vaginal bleeding for 1 to 2 weeks. It may be similar to or slightly heavier than a normal period. The bleeding should get lighter after a week. Use pads instead of tampons. You may use tampons during your next period, which should start in 3 to 6 weeks. · Take an over-the-counter pain medicine, such as acetaminophen (Tylenol), ibuprofen (Advil, Motrin), or naproxen (Aleve) for cramps. Read and follow all instructions on the label. You may have cramps for several days after the miscarriage. · Do not take two or more pain medicines at the same time unless the doctor told you to. Many pain medicines have acetaminophen, which is Tylenol. Too much acetaminophen (Tylenol) can be harmful. · Use a clear container to save any tissue that you pass. Take it to your doctor's office as soon as you can.   · Do not have sex until the bleeding stops. · You may return to your normal activities if you feel well enough to do so. But you should avoid heavy exercise until the bleeding stops. · If you plan to get pregnant again, check with your doctor. Most doctors suggest waiting until you have had at least one normal period before you try to get pregnant. · If you do not want to get pregnant, ask your doctor about birth control. You can get pregnant again before your next period starts if you are not using birth control. · You may be low in iron because of blood loss. Eat a balanced diet that is high in iron and vitamin C. Foods rich in iron include red meat, shellfish, eggs, beans, and leafy green vegetables. Foods high in vitamin C include citrus fruits, tomatoes, and broccoli. Talk to your doctor about whether you need to take iron pills or a multivitamin. · The loss of a pregnancy can be very hard. You may wonder why it happened and blame yourself. Talking to family members, friends, a counselor, or your doctor may help you cope with your loss. When should you call for help? Call 911 anytime you think you may need emergency care. For example, call if:    · You passed out (lost consciousness).    Call your doctor now or seek immediate medical care if:    · You have severe vaginal bleeding.     · You are dizzy or lightheaded, or you feel like you may faint.     · You have new or worse pain in your belly or pelvis.     · You have a fever.     · You have vaginal discharge that smells bad.    Watch closely for changes in your health, and be sure to contact your doctor if:    · You do not get better as expected. Where can you learn more? Go to http://mirela-servando.info/. Enter E802 in the search box to learn more about \"Miscarriage: Care Instructions. \"  Current as of: September 5, 2018  Content Version: 11.9  © 7149-8149 [employer].  Care instructions adapted under license by [employer] (which disclaims liability or warranty for this information). If you have questions about a medical condition or this instruction, always ask your healthcare professional. [address] any warranty or liability for your use of this information.

## 2019-07-02 LAB — HCG INTACT+B SERPL-ACNC: 219 MIU/ML

## 2019-07-15 ENCOUNTER — LAB ONLY (OUTPATIENT)
Dept: FAMILY MEDICINE CLINIC | Age: 39
End: 2019-07-15

## 2019-07-15 DIAGNOSIS — O03.9 MISCARRIAGE: Primary | ICD-10-CM

## 2019-07-16 ENCOUNTER — TELEPHONE (OUTPATIENT)
Dept: FAMILY MEDICINE CLINIC | Age: 39
End: 2019-07-16

## 2019-07-16 LAB — HCG INTACT+B SERPL-ACNC: 2 MIU/ML

## 2019-07-16 NOTE — TELEPHONE ENCOUNTER
Called patient and informed her of pregnancy hormone level undetectable per Dr. Elmer Carlos and she does not need to come in for anymore checks.  Patient verbalized understanding

## 2019-07-16 NOTE — TELEPHONE ENCOUNTER
----- Message from Taty Hale DO sent at 7/16/2019  1:58 PM EDT -----  Will you let patient know her pregnancy hormone level is undetectable now and she does not need to follow up for further checks.       Thanks,  MV     ----- Message -----  From: Mahendra Vergara Lab Results In  Sent: 7/16/2019   9:38 AM  To: Taty Hale DO

## 2019-08-05 ENCOUNTER — HOSPITAL ENCOUNTER (OUTPATIENT)
Dept: MAMMOGRAPHY | Age: 39
Discharge: HOME | End: 2019-08-05
Attending: FAMILY MEDICINE
Payer: OTHER GOVERNMENT

## 2019-08-05 ENCOUNTER — TELEPHONE (OUTPATIENT)
Dept: FAMILY MEDICINE CLINIC | Age: 39
End: 2019-08-05

## 2019-08-05 DIAGNOSIS — Z12.39 SCREENING BREAST EXAMINATION: ICD-10-CM

## 2019-08-05 PROCEDURE — 77067 SCR MAMMO BI INCL CAD: CPT

## 2019-08-05 NOTE — TELEPHONE ENCOUNTER
Patient returned call to Dr. Eamon Deleon. Dr. Eamon Deleon not available at the time of call. Please return call to patient.

## 2019-08-05 NOTE — TELEPHONE ENCOUNTER
8/5/2019 9:17 AM    Called to reach out and see how patient was doing after miscarriage. This was very much a desired pregnancy and wanted to check on how her mood was. Left VM to call back.      Moo Sunew, DO

## 2019-10-28 ENCOUNTER — OFFICE VISIT (OUTPATIENT)
Dept: FAMILY MEDICINE CLINIC | Age: 39
End: 2019-10-28

## 2019-10-28 VITALS
OXYGEN SATURATION: 98 % | HEIGHT: 63 IN | RESPIRATION RATE: 20 BRPM | SYSTOLIC BLOOD PRESSURE: 152 MMHG | WEIGHT: 264 LBS | BODY MASS INDEX: 46.78 KG/M2 | DIASTOLIC BLOOD PRESSURE: 82 MMHG | HEART RATE: 105 BPM | TEMPERATURE: 98.8 F

## 2019-10-28 DIAGNOSIS — H92.02 LEFT EAR PAIN: Primary | ICD-10-CM

## 2019-10-28 DIAGNOSIS — R03.0 ELEVATED BP WITHOUT DIAGNOSIS OF HYPERTENSION: ICD-10-CM

## 2019-10-28 DIAGNOSIS — Z87.59 MISCARRIAGE WITHIN LAST 12 MONTHS: ICD-10-CM

## 2019-10-28 NOTE — PROGRESS NOTES
HPI     Chief Complaint   Patient presents with    Ear Pain     left ear pain x 2 weeks, pain level 0/10      She is a 46 y.o. female who presents for ear pain. Ear Pain  L ear. x2 weeks. Started after blowing her nose and heard a \"pop. \" States it is feeling better today. Elevated BP wo Dx of HTN  Checked BP at home this morning. SBP 110s. States it is always high when she comes in along with her pulse. Feels nervous today as this is her first visit since her miscarriage. Miscarriage  Doing better since. States she is still tearful.  and her are actively trying to get pregnant again. Did ask about what could be done if difficulty getting pregnant. Review of Systems   Constitutional: Negative for fever. HENT: Negative for sore throat. Reviewed PmHx, RxHx, FmHx, SocHx, AllgHx and updated and dated in the chart. Physical Exam:  Visit Vitals  /82 (BP 1 Location: Left arm, BP Patient Position: Sitting)   Pulse (!) 105   Temp 98.8 °F (37.1 °C) (Oral)   Resp 20   Ht 5' 3\" (1.6 m)   Wt 264 lb (119.7 kg)   LMP 04/15/2019 (Approximate)   SpO2 98%   BMI 46.77 kg/m²     Physical Exam   Constitutional: She appears well-developed and well-nourished. No distress. HENT:   Nose: Nose normal.   Mouth/Throat: Oropharynx is clear and moist.   L ear with large amount of waxed pressed against TM, cleaned out after lavage. L TM WNL after lavage. R TM WNL. Cardiovascular: Normal rate, regular rhythm and normal heart sounds. No murmur heard. Pulmonary/Chest: Effort normal and breath sounds normal. No stridor. No respiratory distress. She has no wheezes. Skin: Skin is warm and dry. She is not diaphoretic. Nursing note and vitals reviewed. No results found for this or any previous visit (from the past 12 hour(s)).        Assessment / Plan     L Ear Pain - improved s/p lavage    HTN - BP elevated in office but patient adamant okay at home and does not desire to treat this now, continue to monitor at home    Miscarriage - Overall seems to be doing well. Discussed option for referral to OB or Reproductive Endocrine for second opinion if desired or to discuss further however she declined at this time    I have discussed the diagnosis with the patient and the intended plan as seen in the above orders. The patient has received an after-visit summary and questions were answered concerning future plans. I have discussed medication side effects and warnings with the patient as well.     Patient discussed with Dr. Jai HAYES(ttending)    Yamliet Lopes DO  Family Medicine Resident

## 2019-10-28 NOTE — PROGRESS NOTES
Chief Complaint   Patient presents with    Ear Pain     left ear pain x 2 weeks, pain level 0/10      1. Have you been to the ER, urgent care clinic since your last visit? Hospitalized since your last visit? No    2. Have you seen or consulted any other health care providers outside of the [address] [place] [address] since your last visit? Include any pap smears or colon screening. No    Reviewed record in preparation for visit and have obtained necessary documentation.

## 2020-05-21 ENCOUNTER — HOSPITAL ENCOUNTER (EMERGENCY)
Age: 40
Discharge: HOME | End: 2020-05-21
Attending: EMERGENCY MEDICINE | Admitting: EMERGENCY MEDICINE
Payer: OTHER GOVERNMENT

## 2020-05-21 VITALS
TEMPERATURE: 98.9 F | SYSTOLIC BLOOD PRESSURE: 159 MMHG | RESPIRATION RATE: 20 BRPM | OXYGEN SATURATION: 100 % | HEART RATE: 131 BPM | DIASTOLIC BLOOD PRESSURE: 93 MMHG | BODY MASS INDEX: 46.78 KG/M2 | HEIGHT: 63 IN | WEIGHT: 264 LBS

## 2020-05-21 DIAGNOSIS — M54.50 ACUTE BILATERAL LOW BACK PAIN WITHOUT SCIATICA: Primary | ICD-10-CM

## 2020-05-21 LAB — HCG SERPL-ACNC: <1 MIU/ML (ref 0–6)

## 2020-05-21 PROCEDURE — 96374 THER/PROPH/DIAG INJ IV PUSH: CPT

## 2020-05-21 PROCEDURE — 74011250636 HC RX REV CODE- 250/636: Performed by: PHYSICIAN ASSISTANT

## 2020-05-21 PROCEDURE — 84702 CHORIONIC GONADOTROPIN TEST: CPT

## 2020-05-21 PROCEDURE — 36415 COLL VENOUS BLD VENIPUNCTURE: CPT

## 2020-05-21 PROCEDURE — 74011636637 HC RX REV CODE- 636/637: Performed by: PHYSICIAN ASSISTANT

## 2020-05-21 PROCEDURE — 99283 EMERGENCY DEPT VISIT LOW MDM: CPT

## 2020-05-21 RX ORDER — PREDNISONE 20 MG/1
20 TABLET ORAL DAILY
Qty: 4 TAB | Refills: 0 | Status: SHIPPED | OUTPATIENT
Start: 2020-05-21 | End: 2020-05-25

## 2020-05-21 RX ORDER — BACLOFEN 10 MG/1
10 TABLET ORAL 3 TIMES DAILY
Qty: 21 TAB | Refills: 0 | Status: SHIPPED | OUTPATIENT
Start: 2020-05-21 | End: 2020-05-28

## 2020-05-21 RX ORDER — DIAZEPAM 10 MG/2ML
5 INJECTION INTRAMUSCULAR
Status: COMPLETED | OUTPATIENT
Start: 2020-05-21 | End: 2020-05-21

## 2020-05-21 RX ORDER — PREDNISONE 20 MG/1
20 TABLET ORAL
Status: COMPLETED | OUTPATIENT
Start: 2020-05-21 | End: 2020-05-21

## 2020-05-21 RX ORDER — LIDOCAINE 4 G/100G
PATCH TOPICAL
Qty: 2 PACKAGE | Refills: 0 | Status: SHIPPED | OUTPATIENT
Start: 2020-05-21

## 2020-05-21 RX ORDER — DIAZEPAM 10 MG/2ML
5 INJECTION INTRAMUSCULAR
Status: DISCONTINUED | OUTPATIENT
Start: 2020-05-21 | End: 2020-05-21

## 2020-05-21 RX ADMIN — DIAZEPAM 5 MG: 5 INJECTION, SOLUTION INTRAMUSCULAR; INTRAVENOUS at 14:49

## 2020-05-21 RX ADMIN — PREDNISONE 20 MG: 20 TABLET ORAL at 14:49

## 2020-05-21 NOTE — ED PROVIDER NOTES
47 y/o female with no signficant pmhx presents with acute low back pain since 11:45AM today. Pt reports she had similar pain 2 days ago that resolved with aleve and rest. This morning she turned while standing at the sink and had sudden severe tight pain across her bilateral back. Notes she is able to walk with pain and is unable to sit or lie down comfortably. Denies fever, history of cancer, numbness, weakness, saddle paresthesias, loss of bowel or bladder function. History reviewed. No pertinent past medical history. History reviewed. No pertinent surgical history.       Family History:   Problem Relation Age of Onset    Hypertension Mother     Cancer Mother     Breast Cancer Mother 35    Diabetes Father     Cancer Maternal Grandmother     Diabetes Paternal Grandmother        Social History     Socioeconomic History    Marital status:      Spouse name: Not on file    Number of children: Not on file    Years of education: Not on file    Highest education level: Not on file   Occupational History    Not on file   Social Needs    Financial resource strain: Not on file    Food insecurity     Worry: Not on file     Inability: Not on file    Transportation needs     Medical: Not on file     Non-medical: Not on file   Tobacco Use    Smoking status: Never Smoker    Smokeless tobacco: Never Used   Substance and Sexual Activity    Alcohol use: No    Drug use: No    Sexual activity: Yes     Partners: Male     Birth control/protection: None   Lifestyle    Physical activity     Days per week: Not on file     Minutes per session: Not on file    Stress: Not on file   Relationships    Social connections     Talks on phone: Not on file     Gets together: Not on file     Attends Muslim service: Not on file     Active member of club or organization: Not on file     Attends meetings of clubs or organizations: Not on file     Relationship status: Not on file    Intimate partner violence Fear of current or ex partner: Not on file     Emotionally abused: Not on file     Physically abused: Not on file     Forced sexual activity: Not on file   Other Topics Concern    Not on file   Social History Narrative    Not on file         ALLERGIES: Penicillins    Review of Systems   Constitutional: Negative for fever. HENT: Negative for congestion and sore throat. Respiratory: Negative for cough and shortness of breath. Cardiovascular: Negative for chest pain. Gastrointestinal: Negative for nausea and vomiting. Genitourinary: Negative for dysuria. Musculoskeletal: Positive for back pain (low bilateral). Negative for gait problem and myalgias. Skin: Negative for rash. Neurological: Negative for dizziness and weakness. Vitals:    05/21/20 1313   BP: (!) 159/93   Pulse: (!) 131   Resp: 20   Temp: 98.9 °F (37.2 °C)   SpO2: 100%   Weight: 119.7 kg (264 lb)   Height: 5' 3\" (1.6 m)            Physical Exam  Vitals signs and nursing note reviewed. Constitutional:       General: She is not in acute distress. HENT:      Head: Normocephalic. Nose: Nose normal.      Mouth/Throat:      Mouth: Mucous membranes are moist.   Eyes:      Extraocular Movements: Extraocular movements intact. Neck:      Musculoskeletal: Normal range of motion. Pulmonary:      Effort: Pulmonary effort is normal. No respiratory distress. Musculoskeletal:      Comments: Paraspinal tenderness of low back, no midline tenderness, no erythema or deformity   Skin:     General: Skin is dry. Findings: No rash. Neurological:      General: No focal deficit present. Mental Status: She is alert and oriented to person, place, and time. Sensory: No sensory deficit. Motor: No weakness. Coordination: Coordination normal.      Gait: Gait abnormal (antalgic).    Psychiatric:         Mood and Affect: Mood normal.        Medications   predniSONE (DELTASONE) tablet 20 mg (20 mg Oral Given 5/21/20 6156) diazePAM (VALIUM) injection 5 mg (5 mg IntraVENous Given 5/21/20 1449)     Labs Reviewed   BETA HCG, QT     No orders to display       MDM     47 y/o female acute bilateral low back pain has paraspinal tenderness, no midline involvement, no signs or symptoms of cauda equina or other neurologic involvement. Valium and prednisone given in ED greatly improved pain and mobility. Prescriptions for muscle relaxer and steroid provided. Discussed appropriate follow-up with ortho as well as indications to return to ED such as fever, inability to ambulate, unbearable pain, neurologic symptoms. Case reviewed with attending physician, Dr. Jai Garcia.     Procedures    Schuylerjuana Hinojosa PA-C  5/21/2020

## 2020-05-21 NOTE — DISCHARGE INSTRUCTIONS

## 2020-05-21 NOTE — ED TRIAGE NOTES
Pt arrives ambulatory to the ED w/ c/c of lower back spasm. Pt reports that Monday she slept on her back wrong and Tuesday the spasms began. The spasms have been 10/10 for the last hour. Pt reports @ 1000 she took Tylenol for the pain.

## 2020-06-02 ENCOUNTER — VIRTUAL VISIT (OUTPATIENT)
Dept: FAMILY MEDICINE CLINIC | Age: 40
End: 2020-06-02

## 2020-06-02 DIAGNOSIS — J30.9 ALLERGIC CONJUNCTIVITIS OF BOTH EYES AND RHINITIS: Primary | ICD-10-CM

## 2020-06-02 DIAGNOSIS — H10.13 ALLERGIC CONJUNCTIVITIS OF BOTH EYES AND RHINITIS: Primary | ICD-10-CM

## 2020-06-02 DIAGNOSIS — L23.9 ALLERGIC DERMATITIS: ICD-10-CM

## 2020-06-02 DIAGNOSIS — J30.2 SEASONAL ALLERGIC REACTION: ICD-10-CM

## 2020-06-02 RX ORDER — HYDROCORTISONE 25 MG/G
CREAM TOPICAL 2 TIMES DAILY
Qty: 30 G | Refills: 0 | Status: SHIPPED | OUTPATIENT
Start: 2020-06-02

## 2020-06-02 RX ORDER — OLOPATADINE HYDROCHLORIDE 1 MG/ML
2 SOLUTION/ DROPS OPHTHALMIC 2 TIMES DAILY
Qty: 1 BOTTLE | Refills: 1 | Status: SHIPPED | OUTPATIENT
Start: 2020-06-02

## 2020-06-02 RX ORDER — LORATADINE 10 MG/1
10 TABLET ORAL DAILY
Qty: 30 TAB | Refills: 1 | Status: SHIPPED | OUTPATIENT
Start: 2020-06-02

## 2020-06-02 RX ORDER — FLUTICASONE FUROATE 27.5 UG/1
2 SPRAY, METERED NASAL DAILY
Qty: 9.1 G | Refills: 1 | Status: SHIPPED | OUTPATIENT
Start: 2020-06-02

## 2020-06-02 NOTE — PROGRESS NOTES
July Lew  46 y.o. female  1980  [address] [facility] [address]  [id number]   [address]:    Telemedicine Progress Note  Noa Montes De Oca DO       Encounter Date and Time: June 2, 2020 at 8:07 AM    Consent: July Lew, who was seen by synchronous (real-time) audio-video technology, and/or her healthcare decision maker, is aware that this patient-initiated, Telehealth encounter on 6/2/2020 is a billable service, with coverage as determined by her insurance carrier. She is aware that she may receive a bill and has provided verbal consent to proceed: Yes. Chief Complaint   Patient presents with    Eye Problem     History of Present Illness   July Lew is a 46 y.o. female was evaluated by synchronous (real-time) audio-video technology from home, through a secure patient portal.    L eye itchy for months. First time this occurred in Feb. Started to go away and then in March it came back feeling itchy and raw and optometrist gave Neomycin and Polymixin which got her through April. But then after she stopped using it it came back again. Has developed patchy, itchy wheels on her upper arms and on her cheek. Has been using lotion and daily moisturizer on the areas. The whites of her eyes are not red, no discharge. Mainly on the L but sometimes occasionally on R. States 2 weeks ago she went to the ER for back pain and was given a short steroid burst which cleared everything up but then it came back right away. Review of Systems   Review of Systems   Constitutional: Negative for chills and fever. HENT: Positive for congestion. Negative for sore throat. Respiratory: Negative for cough and shortness of breath.       Vitals/Objective:     General: alert, cooperative, no distress   Mental  status: mental status: alert, oriented to person, place, and time, normal mood, behavior, speech, dress, motor activity, and thought processes   Resp: resp: normal effort and no respiratory distress   Neuro: neuro: no gross deficits   Skin: skin: no discoloration or lesions of concern on visible areas   Due to this being a TeleHealth evaluation, many elements of the physical examination are unable to be assessed. Assessment and Plan:   Time-based coding, delete if not needed: I spent at least 10 minutes with this established patient, and >50% of the time was spent counseling and/or coordinating care regarding seasonal allergies, allergic conunctivitis, skin care, allergic dermatitis    1. Seasonal allergic reaction  Recommend Claritin, Flonase.  - fluticasone (Flonase Sensimist) 27.5 mcg/actuation nasal spray; 2 Sprays by Nasal route daily. Dispense: 9.1 g; Refill: 1  - loratadine (Claritin) 10 mg tablet; Take 1 Tab by mouth daily. Dispense: 30 Tab; Refill: 1    2. Allergic conjunctivitis of both eyes and rhinitis  Flonase, Claritin, Pataday. If not improved may need to see Optho. Differential also includes ocular rosacea but less likely. - fluticasone (Flonase Sensimist) 27.5 mcg/actuation nasal spray; 2 Sprays by Nasal route daily. Dispense: 9.1 g; Refill: 1  - loratadine (Claritin) 10 mg tablet; Take 1 Tab by mouth daily. Dispense: 30 Tab; Refill: 1  - olopatadine (Pataday) 0.1 % ophthalmic solution; Administer 2 Drops to both eyes two (2) times a day. Dispense: 1 Bottle; Refill: 1    3. Allergic dermatitis  Recommend to use gentle cleansers and detergents. Avoid wearing makeup. Do not use Hydrocortisone on the face. Recommend creams directed at eczema that are gentle for these areas. - hydrocortisone (HYTONE) 2.5 % topical cream; Apply  to affected area two (2) times a day. use thin layer, do not apply to face  Dispense: 30 g; Refill: 0    Time spent in direct conversation with the patient to include medical condition(s) discussed, assessment and treatment plan:      We discussed the expected course, resolution and complications of the diagnosis(es) in detail.   Medication risks, benefits, costs, interactions, and alternatives were discussed as indicated. I advised her to contact the office if her condition worsens, changes or fails to improve as anticipated. She expressed understanding with the diagnosis(es) and plan. Patient understands that this encounter was a temporary measure, and the importance of further follow up and examination was emphasized. Patient verbalized understanding. Patient informed to follow up: PRN. Electronically Signed: Isrrael Coulter DO    Patient discussed with Dr. Lydia Lovell (Attending)    Claribel Mcintosh is a 46 y.o. female who was evaluated by an audio-video encounter for concerns as above. Patient identification was verified prior to start of the visit. A caregiver was present when appropriate. Due to this being a TeleHealth encounter (During IGJTE-04 public health emergency), evaluation of the following organ systems was limited: Vitals/Constitutional/EENT/Resp/CV/GI//MS/Neuro/Skin/Heme-Lymph-Imm. Pursuant to the emergency declaration under the [facility] waiver authority and the [employer] Act, this Virtual Visit was conducted, with patient's (and/or legal guardian's) consent, to reduce the patient's risk of exposure to COVID-19 and provide necessary medical care. Services were provided through a synchronous discussion virtually to substitute for in-person clinic visit. I was at home. The patient was at home. History   Patients past medical, surgical and family histories were reviewed and updated. No past medical history on file. No past surgical history on file.   Family History   Problem Relation Age of Onset    Hypertension Mother     Cancer Mother     Breast Cancer Mother 35    Diabetes Father     Cancer Maternal Grandmother     Diabetes Paternal Grandmother      Social History     Socioeconomic History    Marital status:      Spouse name: Not on file    Number of children: Not on file    Years of education: Not on file    Highest education level: Not on file   Occupational History    Not on file   Social Needs    Financial resource strain: Not on file    Food insecurity     Worry: Not on file     Inability: Not on file    Transportation needs     Medical: Not on file     Non-medical: Not on file   Tobacco Use    Smoking status: Never Smoker    Smokeless tobacco: Never Used   Substance and Sexual Activity    Alcohol use: No    Drug use: No    Sexual activity: Yes     Partners: Male     Birth control/protection: None   Lifestyle    Physical activity     Days per week: Not on file     Minutes per session: Not on file    Stress: Not on file   Relationships    Social connections     Talks on phone: Not on file     Gets together: Not on file     Attends Muslim service: Not on file     Active member of club or organization: Not on file     Attends meetings of clubs or organizations: Not on file     Relationship status: Not on file    Intimate partner violence     Fear of current or ex partner: Not on file     Emotionally abused: Not on file     Physically abused: Not on file     Forced sexual activity: Not on file   Other Topics Concern    Not on file   Social History Narrative    Not on file     Patient Active Problem List   Diagnosis Code    HTN in pregnancy, chronic O10.919    Hx of abnormal cervical Pap smear Z87.42    FHx: breast cancer in first degree relative Z80.3    Maternal obesity affecting pregnancy, antepartum O99.210    Antepartum multigravida of advanced maternal age O12.46    Obesity, morbid ([facility] [address] E66.01          Current Medications/Allergies   Medications and Allergies reviewed:    Current Outpatient Medications   Medication Sig Dispense Refill    fluticasone (Flonase Sensimist) 27.5 mcg/actuation nasal spray 2 Sprays by Nasal route daily.  9.1 g 1    loratadine (Claritin) 10 mg tablet Take 1 Tab by mouth daily. 30 Tab 1    olopatadine (Pataday) 0.1 % ophthalmic solution Administer 2 Drops to both eyes two (2) times a day. 1 Bottle 1    hydrocortisone (HYTONE) 2.5 % topical cream Apply  to affected area two (2) times a day. use thin layer, do not apply to face 30 g 0    lidocaine 4 % patch Apply patch to lower back every 12 hours as needed for pain 2 Package 0    prenatal vit-iron fumarate-fa 27 mg iron- 0.8 mg tab tablet Take 1 Tab by mouth daily.        Allergies   Allergen Reactions    Penicillins Rash

## 2020-06-03 NOTE — PROGRESS NOTES
[address] Medicine Residency Attending Addendum:  Dr. Neida Esqueda, DO,  the patient and I were not physically present during this encounter. The resident and I are concurrently monitoring the patient care through appropriate telecommunication technology. I discussed the findings, assessment and plan with the resident and agree with the resident's findings and plan as documented in the resident's note.       Jian Danielle MD